# Patient Record
Sex: FEMALE | Race: WHITE | NOT HISPANIC OR LATINO | Employment: FULL TIME | URBAN - METROPOLITAN AREA
[De-identification: names, ages, dates, MRNs, and addresses within clinical notes are randomized per-mention and may not be internally consistent; named-entity substitution may affect disease eponyms.]

---

## 2020-08-11 ENCOUNTER — VIRTUAL VISIT (OUTPATIENT)
Dept: FAMILY MEDICINE | Facility: OTHER | Age: 25
End: 2020-08-11

## 2020-08-11 ENCOUNTER — AMBULATORY - HEALTHEAST (OUTPATIENT)
Dept: FAMILY MEDICINE | Facility: CLINIC | Age: 25
End: 2020-08-11

## 2020-08-11 DIAGNOSIS — Z20.822 SUSPECTED COVID-19 VIRUS INFECTION: ICD-10-CM

## 2020-08-12 NOTE — PROGRESS NOTES
"Date: 2020 10:38:53  Clinician: Marcos Miranda  Clinician NPI: [id number]  Patient: Latasha Hawley  Patient : 1995  Patient Address: [facility] [address], [place] [address]  Patient Phone: [phone]  Visit Protocol: URI  Patient Summary:  Latasha is a 25 year old ( : 1995 ) female who initiated a Visit for COVID-19 (Coronavirus) evaluation and screening. When asked the question \"Please sign me up to receive news, health information and promotions. \", Latasha responded \"No\".    When asked when her symptoms started, Latasha reported that she does not have any symptoms.   She denies taking antibiotic medication in the past month and having recent facial or sinus surgery in the past 60 days.    Pertinent COVID-19 (Coronavirus) information  In the past 14 days, Latasha has not worked in a congregate living setting.   She does not work or volunteer as healthcare worker or a  and does not work or volunteer in a healthcare facility.   Latasha also has not lived in a congregate living setting in the past 14 days. She does not live with a healthcare worker.   Latasha has had a close contact with a laboratory-confirmed COVID-19 patient in the last 14 days. She was exposed at her work. Additional information about contact with COVID-19 (Coronavirus) patient as reported by the patient (free text): exposed to a coworker, at work, on 6/3.    Patient reported they are not living in the same household with a COVID-19 positive patient.  Patient was in an enclosed space for greater than 15 minutes with a COVID-19 patient.  Since 2019, Latasha and has not had upper respiratory infection or influenza-like illness. Has not been diagnosed with lab-confirmed COVID-19 test   Pertinent medical history  Latasha does not get yeast infections when she takes antibiotics.   Latasha does not need a return to work/school note.   Weight: 160 lbs   Latasha does not smoke or use smokeless tobacco.   She denies pregnancy and denies " breastfeeding. She has menstruated in the past month.   Weight: 160 lbs    MEDICATIONS: No current medications, ALLERGIES: NKDA  Clinician Response:  Dear Latasha,   Based on your exposure to COVID-19 (coronavirus), we would like to test you for this virus.  1. Please call [phone] to schedule your visit. Explain that you were [facility] to have a COVID-19 test. Be ready to share your [facility] visit ID number.  The following will serve as your written order for this COVID Test, ordered by me, for the indication of suspected COVID [Z20.828]: The test will be ordered in [facility], our electronic health record, after you are scheduled. It will show as ordered and authorized by Mart Guzmán MD.  Order: COVID-19 (coronavirus) PCR for ASYMPTOMATIC EXPOSURE testing from [facility].  If you know you have had close contact with someone who tested positive, you should be quarantined for 14 days after this exposure. You should stay in quarantine for the14 days even if the covid test is negative, the optimal time to test after exposure is 5-7 days from the exposure  Quarantine means   What should I do?  For safety, it's very important to follow these rules. Do this for 14 days after the date you were last exposed to the virus..  Stay home and away from others. Don't go to school or anywhere else. Generally quarantine means staying home from work but there are some exceptions to this. Please contact your workplace.   No hugging, kissing or shaking hands.  Don't let anyone visit.  Cover your mouth and nose with a mask, tissue or washcloth to avoid spreading germs.  Wash your hands and face often. Use soap and water.  What are the symptoms of COVID-19?  The most common symptoms are cough, fever and trouble breathing. Less common symptoms include headache, body aches, fatigue (feeling very tired), chills, sore throat, stuffy or runny nose, diarrhea (loose poop), loss of taste or smell, belly pain, and nausea or vomiting (feeling sick  to your stomach or throwing up).  After 14 days, if you have still don't have symptoms, you likely don't have this virus.  If you develop symptoms, follow these guidelines.  If you're normally healthy: Please start another [facility] visit to report your symptoms. Go to [email or web address].  If you have a serious health problem (like cancer, heart failure, an organ transplant or kidney disease): Call your specialty clinic. Let them know that you might have COVID-19.  2. When it's time for your COVID test:  Stay at least 6 feet away from others. (If someone will drive you to your test, stay in the backseat, as far away from the  as you can.)  Cover your mouth and nose with a mask, tissue or washcloth.  Go straight to the testing site. Don't make any stops on the way there or back.  Please note  Caregivers in these groups are at risk for severe illness due to COVID-19:  o People 65 years and older  o People who live in a nursing home or long-term care facility  o People with chronic disease (lung, heart, cancer, diabetes, kidney, liver, immunologic)  o People who have a weakened immune system, including those who:  Are in cancer treatment  Take medicine that weakens the immune system, such as corticosteroids  Had a bone marrow or organ transplant  Have an immune deficiency  Have poorly controlled HIV or AIDS  Are obese (body mass index of 40 or higher)  Smoke regularly  Where can I get more information?  [facility] -- About COVID-19: [email or web address]  CDC -- What to Do If You're Sick: www.cdc.gov/coronavirus/2019-ncov/about/steps-when-sick.html  CDC -- Ending Home Isolation: www.cdc.gov/coronavirus/2019-ncov/hcp/disposition-in-home-patients.html  CDC -- Caring for Someone: www.cdc.gov/coronavirus/2019-ncov/if-you-are-sick/care-for-someone.html  [facility] -- Interim Guidance for Hospital Discharge to Home: www.health.[facility].[place]/diseases/coronavirus/hcp/hospdischarge.pdf  [facility]  trials (COVID-19 research studies): clinicalaffairs.[facility]/n-clinical-trials  Below are the COVID-19 hotlines at the [place] Department of Health ([facility]). Interpreters are available.  For health questions: Call 761-355-4400 or 1-194.623.1403 (7 a.m. to 7 p.m.)  For questions about schools and childcare: Call 444-730-3532 or 1-219.306.7591 (7 a.m. to 7 p.m.)    Diagnosis: Contact with and (suspected) exposure to other viral communicable diseases  Diagnosis ICD: Z20.828

## 2020-08-13 ENCOUNTER — COMMUNICATION - HEALTHEAST (OUTPATIENT)
Dept: SCHEDULING | Facility: CLINIC | Age: 25
End: 2020-08-13

## 2020-11-18 ENCOUNTER — VIRTUAL VISIT (OUTPATIENT)
Dept: FAMILY MEDICINE | Facility: OTHER | Age: 25
End: 2020-11-18

## 2020-11-18 NOTE — PROGRESS NOTES
"Date: 2020 14:11:45  Clinician: Tameka Gan  Clinician NPI: [id number]  Patient: Latasha Hawley  Patient : 1995  Patient Address: [facility] [address], [place] [address]  Patient Phone: [phone]  Visit Protocol: URI  Patient Summary:  Latasha is a 25 year old ( : 1995 ) female who initiated a [facility] Visit for COVID-19 (Coronavirus) evaluation and screening. When asked the question \"Please sign me up to receive news, health information and promotions. \", Latasha responded \"No\".    Latasha states her symptoms started today.   Her symptoms consist of a sore throat and nasal congestion. She is experiencing difficulty breathing due to nasal congestion but she is not short of breath.   Symptom details     Nasal secretions: The color of her mucus is clear.    Sore throat: Latasha reports having mild throat pain (1-3 on a 10 point pain scale), does not have exudate on her tonsils, and can swallow liquids. The lymph nodes in her neck are not enlarged. A rash has not appeared on the skin since the sore throat started.      Latasha denies having vomiting, rhinitis, facial pain or pressure, myalgias, chills, malaise, teeth pain, ageusia, diarrhea, ear pain, headache, wheezing, fever, enlarged lymph nodes, cough, nausea, and anosmia. She also denies taking antibiotic medication in the past month and having recent facial or sinus surgery in the past 60 days.   Precipitating events  Within the past week, Latasha has not been exposed to someone with strep throat.   Pertinent COVID-19 (Coronavirus) information  Latasha does not work or volunteer as healthcare worker or a . In the past 14 days, Latasha has not worked or volunteered at a healthcare facility or group living setting.   In the past 14 days, she also has not lived in a congregate living setting.   Latasha has had a close contact with a laboratory-confirmed COVID-19 patient within 14 days of symptom onset. She was exposed at her work. Date Latasha was exposed to the " laboratory-confirmed COVID-19 patient: 11/15/2020   Additional information about contact with COVID-19 (Coronavirus) patient as reported by the patient (free text): Potentially exposed to two people at work who we now know are positive or are showing symptoms. Boyfriend was exposed at work and is awaiting results from his second test.    Since December 2019, Latasha has been tested for COVID-19 and has not had upper respiratory infection or influenza-like illness.      Result of COVID-19 test: Negative     Date of her COVID-19 test: 08/11/2020      Pertinent medical history  Latasha does not get yeast infections when she takes antibiotics.   Latasha does not need a return to work/school note.   Weight: 160 lbs   Latasha does not smoke or use smokeless tobacco.   She denies pregnancy and denies breastfeeding. She has menstruated in the past month.   Weight: 160 lbs    MEDICATIONS: No current medications, ALLERGIES: NKDA  Clinician Response:  Dear Latasha,  Based on the information provided, you have a viral upper respiratory infection, otherwise known as a cold. Symptoms vary from person to person, but can include sneezing, coughing, a runny nose, sore throat, and headache and range from mild to severe.  Unfortunately, there are no medications that can cure a cold, so treatment is focused on controlling symptoms as much as possible. Most people gradually feel better until symptoms are gone in 1-2 weeks.  Medication information  Because you have a viral infection, antibiotics will not help you get better. Treating a viral infection with antibiotics could actually make you feel worse.  Self care  Steps you can take to be as comfortable as possible:     Rest.    Drink plenty of fluids.    Take a warm shower to loosen congestion    Use a cool-mist humidifier.    Use throat lozenges.    Suck on frozen items such as popsicles.    Drink hot tea with lemon and honey.    Gargle with warm salt water (1/4 teaspoon of salt per 8 ounce glass of  water).     When to seek care  Please be seen in a clinic or urgent care if any of the following occur:   New symptoms develop, or symptoms become worse   Call ahead before going to the clinic or urgent care.  Call 911 or go to the emergency room if you feel that your throat is closing off, you suddenly develop a rash, you are unable to swallow fluids, you are drooling, or you are having difficulty breathing.  Additional treatment plan   Your symptoms show that you may have coronavirus (COVID-19). This illness can cause fever, cough and trouble breathing. Many people get a mild case and get better on their own. Some people can get very sick.  Based on the symptoms you have shared, I would like you to be re-checked in 2 to 3 days. Please call your family clinic to set up a video or phone visit.  Will I be tested for COVID-19?  We would like to test you for this virus.   Please call [phone] to schedule your visit. Explain that you were referred by [facility] to have a COVID-19 test. Be ready to share your [facility] visit ID number.   * If you need to schedule in [place] or [email or web address] please call [phone] or for [employer] employees please call [phone].    The following will serve as your written order for this COVID Test, ordered by me, for the indication of suspected COVID [Z20.828]: The test will be ordered in [employer], our electronic health record, after you are scheduled. It will show as ordered and authorized by Mart Guzmán MD.  Order: COVID-19 (Coronavirus) PCR for SYMPTOMATIC testing from [facility].   1.When it's time for your COVID test:   Stay at least 6 feet away from others. (If someone will drive you to your test, stay in the backseat, as far away from the  as you can.)   Cover your mouth and nose with a mask, tissue or washcloth.  Go straight to the testing site. Don't make any stops on the way there or back.      2.Starting now: Stay home and away from others (self-isolate) until:   You've had  "no fever---and no medicine that reduces fever---for one full day (24 hours). And...   Your other symptoms have gotten better. For example, your cough or breathing has improved. And...   At least 10 days have passed since your symptoms started.       During this time, don't leave the house except for testing or medical care.   Stay in your own room, even for meals. Use your own bathroom if you can.   Stay away from others in your home. No hugging, kissing or shaking hands. No visitors.  Don't go to work, school or anywhere else.    Clean \"high touch\" surfaces often (doorknobs, counters, handles, etc.). Use a household cleaning spray or wipes. You'll find a full list of  on the EPA website: www.epa.gov/pesticide-registration/list-n-disinfectants-use-against-sars-cov-2.   Cover your mouth and nose with a mask, tissue or washcloth to avoid spreading germs.  Wash your hands and face often. Use soap and water.  Caregivers in these groups are at risk for severe illness due to COVID-19:  o People 65 years and older  o People who live in a nursing home or long-term care facility  o People with chronic disease (lung, heart, cancer, diabetes, kidney, liver, immunologic)   o People who have a weakened immune system, including those who:   Are in cancer treatment  Take medicine that weakens the immune system, such as corticosteroids  Had a bone marrow or organ transplant  Have an immune deficiency  Have poorly controlled HIV or AIDS  Are obese (body mass index of 40 or higher)  Smoke regularly   o Caregivers should wear gloves while washing dishes, handling laundry and cleaning bedrooms and bathrooms.  o Use caution when washing and drying laundry: Don't shake dirty laundry, and use the warmest water setting that you can.  o For more tips, go to www.cdc.gov/coronavirus/2019-ncov/downloads/10Things.pdf.      How can I take care of myself?   Get lots of rest. Drink extra fluids (unless a doctor has told you not to)   Take " Tylenol (acetaminophen) for fever or pain. If you have liver or kidney problems, ask your family doctor if it's okay to take Tylenol.   Adults can take either:    650 mg (two 325 mg pills) every 4 to 6 hours, or...   1,000 mg (two 500 mg pills) every 8 hours as needed.    Note: Don't take more than 3,000 mg in one day. Acetaminophen is found in many medicines (both prescribed and over-the-counter medicines). Read all labels to be sure you don't take too much.   For children, check the Tylenol bottle for the right dose. The dose is based on the child's age or weight.    If you have other health problems (like cancer, heart failure, an organ transplant or severe kidney disease): Call your specialty clinic if you don't feel better in the next 2 days.       Know when to call 911. Emergency warning signs include:    Trouble breathing or shortness of breath Pain or pressure in the chest that doesn't go away Feeling confused like you haven't felt before, or not being able to wake up Bluish-colored lips or face  Where can I get more information?   [facility] -- About COVID-19: [email or web address]   CDC -- What to Do If You're Sick: www.cdc.gov/coronavirus/2019-ncov/about/steps-when-sick.html   [facility] -- Ending Home Isolation: www.cdc.gov/coronavirus/2019-ncov/hcp/disposition-in-home-patients.html   CDC -- Caring for Someone: www.cdc.gov/coronavirus/2019-ncov/if-you-are-sick/care-for-someone.html   [facility] -- Interim Guidance for Hospital Discharge to Home: www.health.[facility].[place]/diseases/coronavirus/hcp/hospdischarge.pdf   [facility] clinical trials (COVID-19 research studies): clinicalaffairs.[employer].[facility]/umn-clinical-trials    Below are the COVID-19 hotlines at the [place] Department of Health ([facility]). Interpreters are available.    For health questions: Call 639-477-3519 or 1-710.756.6162 (7 a.m. to 7 p.m.) For questions about schools and childcare: Call 334-426-4571 or 1-934.912.2760 (7 a.m. to 7  p.m.)       Diagnosis: Contact with and (suspected) exposure to other viral communicable diseases  Diagnosis ICD: Z20.828

## 2020-11-19 ENCOUNTER — AMBULATORY - HEALTHEAST (OUTPATIENT)
Dept: FAMILY MEDICINE | Facility: CLINIC | Age: 25
End: 2020-11-19

## 2020-11-19 DIAGNOSIS — Z20.822 SUSPECTED COVID-19 VIRUS INFECTION: ICD-10-CM

## 2020-11-22 ENCOUNTER — AMBULATORY - HEALTHEAST (OUTPATIENT)
Dept: FAMILY MEDICINE | Facility: CLINIC | Age: 25
End: 2020-11-22

## 2020-11-22 DIAGNOSIS — Z20.822 SUSPECTED COVID-19 VIRUS INFECTION: ICD-10-CM

## 2020-11-24 ENCOUNTER — COMMUNICATION - HEALTHEAST (OUTPATIENT)
Dept: SCHEDULING | Facility: CLINIC | Age: 25
End: 2020-11-24

## 2021-08-15 ENCOUNTER — HEALTH MAINTENANCE LETTER (OUTPATIENT)
Age: 26
End: 2021-08-15

## 2021-10-11 ENCOUNTER — HEALTH MAINTENANCE LETTER (OUTPATIENT)
Age: 26
End: 2021-10-11

## 2022-02-10 ENCOUNTER — OFFICE VISIT (OUTPATIENT)
Dept: URGENT CARE | Facility: URGENT CARE | Age: 27
End: 2022-02-10
Payer: COMMERCIAL

## 2022-02-10 ENCOUNTER — ANCILLARY PROCEDURE (OUTPATIENT)
Dept: GENERAL RADIOLOGY | Facility: CLINIC | Age: 27
End: 2022-02-10
Attending: PHYSICIAN ASSISTANT
Payer: COMMERCIAL

## 2022-02-10 VITALS
SYSTOLIC BLOOD PRESSURE: 124 MMHG | TEMPERATURE: 98.5 F | WEIGHT: 170 LBS | DIASTOLIC BLOOD PRESSURE: 72 MMHG | HEART RATE: 84 BPM | OXYGEN SATURATION: 97 % | RESPIRATION RATE: 18 BRPM

## 2022-02-10 DIAGNOSIS — S92.355A CLOSED NONDISPLACED FRACTURE OF FIFTH METATARSAL BONE OF LEFT FOOT, INITIAL ENCOUNTER: Primary | ICD-10-CM

## 2022-02-10 DIAGNOSIS — S99.922A FOOT INJURY, LEFT, INITIAL ENCOUNTER: ICD-10-CM

## 2022-02-10 PROCEDURE — 99203 OFFICE O/P NEW LOW 30 MIN: CPT | Performed by: PHYSICIAN ASSISTANT

## 2022-02-10 PROCEDURE — 73630 X-RAY EXAM OF FOOT: CPT | Mod: LT | Performed by: RADIOLOGY

## 2022-02-10 NOTE — PATIENT INSTRUCTIONS
Patient was educated on the natural course of injury. X-ray shows:  Nondisplaced oblique fracture distal shaft left fifth  Metatarsal. Conservative measures discussed including rest, ice, compression, elevation, and over-the-counter analgesics (Tylenol or Ibuprofen) as needed. Placed in ortho boot. See ortho in 7 days for follow-up. Seek emergency care if you develop severe pain/swelling, inability to move extremity, skin paleness, or weakness.

## 2022-02-10 NOTE — PROGRESS NOTES
URGENT CARE VISIT:    SUBJECTIVE:   Chief Complaint   Patient presents with     Urgent Care     Lt foot swollen and bruising after patient had rolled her ankle yesterday evening.      Latasha Hawley is a 26 year old female who presents with a chief complaint of left foot swelling and bruising.  Symptoms began 1 day(s) ago, are moderate and sudden onset  She jumped and landed on side of foot.  Pain exacerbated by weight-bearing. Relieved by rest.  She treated it initially with no therapy. This is the first time this type of injury has occurred to this patient.     PMH: History reviewed. No pertinent past medical history.  Allergies: Patient has no known allergies.   Medications:   No current outpatient medications on file.     Social History:   Social History     Tobacco Use     Smoking status: Never Smoker     Smokeless tobacco: Never Used   Substance Use Topics     Alcohol use: Not Currently       ROS:  Review of systems negative except as stated above.    OBJECTIVE:  /72 (BP Location: Left arm, Patient Position: Sitting, Cuff Size: Adult Regular)   Pulse 84   Temp 98.5  F (36.9  C) (Tympanic)   Resp 18   Wt 77.1 kg (170 lb)   SpO2 97%   GENERAL APPEARANCE: healthy, alert and no distress  MUSCULOSKELETAL: moderate TTP over lateral dorsum of left foot. FROM ankle. Mild TTP over lateral malleolus.  EXTREMITIES: peripheral pulses normal  SKIN: Mild edema over lateral dorsum of left foot.  NEURO: sensation intact.    IMAGING:  Results for orders placed or performed in visit on 02/10/22   XR Foot Left G/E 3 Views     Status: None (Preliminary result)    Narrative    FOOT LEFT THREE OR MORE VIEWS    DATE/TIME: 2/10/2022 1:21 PM    INDICATION: Foot injury, left, initial encounter  COMPARISON: None available.      Impression    IMPRESSION: Nondisplaced oblique fracture distal shaft left fifth  metatarsal. No joint malalignment left foot. Bipartite hallux tibial  sesamoid. No significant joint space narrowing.  Mild soft tissue  swelling adjacent to the fifth metatarsal fracture.        ASSESSMENT:    ICD-10-CM    1. Closed nondisplaced fracture of fifth metatarsal bone of left foot, initial encounter  S92.355A XR Foot Left G/E 3 Views     Orthopedic  Referral     Ankle/Foot Bracing Supplies Order for DME - ONLY FOR DME       PLAN:  Patient Instructions   Patient was educated on the natural course of injury. X-ray shows:  Nondisplaced oblique fracture distal shaft left fifth  Metatarsal. Conservative measures discussed including rest, ice, compression, elevation, and over-the-counter analgesics (Tylenol or Ibuprofen) as needed. Placed in ortho boot. See ortho in 7 days for follow-up. Seek emergency care if you develop severe pain/swelling, inability to move extremity, skin paleness, or weakness.     Patient verbalized understanding and is agreeable to plan. The patient was discharged ambulatory and in stable condition.    Anabelle Dozier PA-C on 2/10/2022 at 1:52 PM

## 2022-02-15 ENCOUNTER — ANCILLARY PROCEDURE (OUTPATIENT)
Dept: GENERAL RADIOLOGY | Facility: CLINIC | Age: 27
End: 2022-02-15
Attending: FAMILY MEDICINE
Payer: COMMERCIAL

## 2022-02-15 ENCOUNTER — OFFICE VISIT (OUTPATIENT)
Dept: ORTHOPEDICS | Facility: CLINIC | Age: 27
End: 2022-02-15
Attending: PHYSICIAN ASSISTANT
Payer: COMMERCIAL

## 2022-02-15 VITALS — WEIGHT: 170 LBS

## 2022-02-15 DIAGNOSIS — S92.355A CLOSED NONDISPLACED FRACTURE OF FIFTH METATARSAL BONE OF LEFT FOOT, INITIAL ENCOUNTER: ICD-10-CM

## 2022-02-15 PROCEDURE — 73630 X-RAY EXAM OF FOOT: CPT | Mod: LT | Performed by: RADIOLOGY

## 2022-02-15 PROCEDURE — 99203 OFFICE O/P NEW LOW 30 MIN: CPT | Performed by: FAMILY MEDICINE

## 2022-02-15 NOTE — LETTER
2/15/2022      RE: Latasha Hawley  [address]  [place] [address]       CHIEF COMPLAINT:  Pain of the Left Foot     HISTORY OF PRESENT ILLNESS  Ms. Hawley is a pleasant 26 year old year old female who presents to clinic today with left fifth MT fx.  Latasha explains that she rolled her ankle while jumping and her a snap    Onset: sudden  Location: left foot  Quality:  sharp  Duration: 5 days   Severity: 4/10 at worst  Timing:constant  Modifying factors:  resting and non-use makes it better, movement and use makes it worse  Associated signs & symptoms: pain, tenderness and swelling  Previous similar pain: Yes, ankle sprains  Treatments to date: Boot, tylenol     Additional history: as documented    Review of Systems:    Have you recently had a a fever, chills, weight loss? No    Do you have any vision problems? No    Do you have any chest pain or edema? No    Do you have any shortness of breath or wheezing?  No    Do you have stomach problems? No    Do you have any numbness or focal weakness? No    Do you have diabetes? No    Do you have problems with bleeding or clotting? No    Do you have an rashes or other skin lesions? No    MEDICAL HISTORY  There is no problem list on file for this patient.      No current outpatient medications on file.       No Known Allergies    No family history on file.    Additional medical/Social/Surgical histories reviewed in EPIC and updated as appropriate.       PHYSICAL EXAM  Wt 77.1 kg (170 lb)     General  - normal appearance, in no obvious distress  CV- Lower leg warm and well perfused  Musculoskeletal - left foot  - stance: normal gait without limp  - inspection: Mild swelling of left foot, ecchymosis lateral foot.  - palpation: Tenderness of fifth metatarsal shaft  - ROM: normal active and passive ROM of great and lesser toes, no pain with MT translation  - strength: 5/5 in all planes  Neuro  - no sensory or motor deficit, grossly normal coordination, normal muscle  tone    IMAGING : XR foot left 3V. Final results and radiologist's interpretation, available in the [facility] health record. Images were reviewed with the patient/family members in the office today. My personal interpretation of the performed imaging is redemonstration of nondisplaced obliquely oriented fifth metatarsal fracture.    X-rays from 2/10/22 independently interpreted.  Urgent care visit from 2/10/22 reviewed.     ASSESSMENT & PLAN  Ms. Hawley is a 26 year old year old female who presents to clinic today for definitive management of fifth metatarsal fracture of left foot.      Surgical vs. nonsurgical means for fracture care reviewed.  Taking into account nondisplaced nature of her oblique this metatarsal shaft fracture, I do think she will do well with nonsurgical care as described below.     Diagnosis: Nondisplaced oblique fracture distal shaft left fifth  metatarsal    -Ensured fit of boot and continue in CAM walker  -May progressively increase WBAT, pain as guide reiterated  -Elevation, ice, OTC analgesics.  -Follow up 2 weeks with repeat films    It was a pleasure seeing Latasha today.    Trenton Hair DO, CAQSM  Primary Care Sports Medicine

## 2022-02-15 NOTE — PROGRESS NOTES
CHIEF COMPLAINT:  Pain of the Left Foot     HISTORY OF PRESENT ILLNESS  Ms. Hawley is a pleasant 26 year old year old female who presents to clinic today with left fifth MT fx.  Latasha explains that she rolled her ankle while jumping and her a snap    Onset: sudden  Location: left foot  Quality:  sharp  Duration: 5 days   Severity: 4/10 at worst  Timing:constant  Modifying factors:  resting and non-use makes it better, movement and use makes it worse  Associated signs & symptoms: pain, tenderness and swelling  Previous similar pain: Yes, ankle sprains  Treatments to date: Boot, tylenol     Additional history: as documented    Review of Systems:    Have you recently had a a fever, chills, weight loss? No    Do you have any vision problems? No    Do you have any chest pain or edema? No    Do you have any shortness of breath or wheezing?  No    Do you have stomach problems? No    Do you have any numbness or focal weakness? No    Do you have diabetes? No    Do you have problems with bleeding or clotting? No    Do you have an rashes or other skin lesions? No    MEDICAL HISTORY  There is no problem list on file for this patient.      No current outpatient medications on file.       No Known Allergies    No family history on file.    Additional medical/Social/Surgical histories reviewed in [facility] and updated as appropriate.       PHYSICAL EXAM  Wt 77.1 kg (170 lb)     General  - normal appearance, in no obvious distress  CV- Lower leg warm and well perfused  Musculoskeletal - left foot  - stance: normal gait without limp  - inspection: Mild swelling of left foot, ecchymosis lateral foot.  - palpation: Tenderness of fifth metatarsal shaft  - ROM: normal active and passive ROM of great and lesser toes, no pain with MT translation  - strength: 5/5 in all planes  Neuro  - no sensory or motor deficit, grossly normal coordination, normal muscle tone    IMAGING : XR foot left 3V. Final results and radiologist's interpretation,  available in the [facility] health record. Images were reviewed with the patient/family members in the office today. My personal interpretation of the performed imaging is redemonstration of nondisplaced obliquely oriented fifth metatarsal fracture.    X-rays from 2/10/22 independently interpreted.  Urgent care visit from 2/10/22 reviewed.     ASSESSMENT & PLAN  Ms. Hawley is a 26 year old year old female who presents to clinic today for definitive management of fifth metatarsal fracture of left foot.      Surgical vs. nonsurgical means for fracture care reviewed.  Taking into account nondisplaced nature of her oblique this metatarsal shaft fracture, I do think she will do well with nonsurgical care as described below.     Diagnosis: Nondisplaced oblique fracture distal shaft left fifth  metatarsal    -Ensured fit of boot and continue in CAM walker  -May progressively increase WBAT, pain as guide reiterated  -Elevation, ice, OTC analgesics.  -Follow up 2 weeks with repeat films    It was a pleasure seeing Latasha today.    Trenton Hair DO, CAQSM  Primary Care Sports Medicine

## 2022-02-28 DIAGNOSIS — S92.355A CLOSED NONDISPLACED FRACTURE OF FIFTH METATARSAL BONE OF LEFT FOOT, INITIAL ENCOUNTER: Primary | ICD-10-CM

## 2022-03-01 ENCOUNTER — OFFICE VISIT (OUTPATIENT)
Dept: ORTHOPEDICS | Facility: CLINIC | Age: 27
End: 2022-03-01
Payer: COMMERCIAL

## 2022-03-01 ENCOUNTER — ANCILLARY PROCEDURE (OUTPATIENT)
Dept: GENERAL RADIOLOGY | Facility: CLINIC | Age: 27
End: 2022-03-01
Attending: FAMILY MEDICINE
Payer: COMMERCIAL

## 2022-03-01 VITALS — WEIGHT: 170 LBS

## 2022-03-01 DIAGNOSIS — S92.355A CLOSED NONDISPLACED FRACTURE OF FIFTH METATARSAL BONE OF LEFT FOOT, INITIAL ENCOUNTER: ICD-10-CM

## 2022-03-01 DIAGNOSIS — S92.355D CLOSED NONDISPLACED FRACTURE OF FIFTH METATARSAL BONE OF LEFT FOOT WITH ROUTINE HEALING, SUBSEQUENT ENCOUNTER: Primary | ICD-10-CM

## 2022-03-01 PROCEDURE — 73630 X-RAY EXAM OF FOOT: CPT | Mod: LT | Performed by: RADIOLOGY

## 2022-03-01 PROCEDURE — 99213 OFFICE O/P EST LOW 20 MIN: CPT | Performed by: FAMILY MEDICINE

## 2022-03-01 NOTE — PROGRESS NOTES
ESTABLISHED PATIENT FOLLOW-UP:  RECHECK (Left foot)       HISTORY OF PRESENT ILLNESS  Ms. Hawley is a pleasant 26 year old year old female who presents to clinic today for follow-up of left foot fracture.     Date of injury/onset: 2/10/2022  Date last seen: 2/15/2022  Following Therapeutic Plan: Yes  Pain: Improving  Function: Improving  Interval History: Overall doing well in the boot, she has walked short distances at home which has felt okay. She does have an increase in pain after being on her feet for long period. She is now having pain in lateral ankle.     Review of Systems:    Do you have fever, chills, weight loss? No    Do you have any vision problems? No    Do you have any chest pain or edema? No    Do you have any shortness of breath or wheezing?  No    Do you have stomach problems? No    Do you have any numbness or focal weakness? No    Do you have diabetes? No    Do you have problems with bleeding or clotting? No    Do you have an rashes or other skin lesions? No    Additional medical/Social/Surgical histories reviewed in [facility] and updated as appropriate.       PHYSICAL EXAM  Wt 77.1 kg (170 lb)     General  - normal appearance, in no obvious distress  CV- Lower leg warm and well perfused  Musculoskeletal - left foot  - stance: normal gait without limp in boot  - inspection: Mild swelling of left foot, ecchymosis lateral foot.  - palpation: Tenderness of fifth metatarsal shaft  - ROM: normal active and passive ROM of great and lesser toes, no pain with ankle motion  - strength: 5/5 in all planes  Neuro  - no sensory or motor deficit, grossly normal coordination, normal muscle tone    IMAGING : XR foot left 3V. Final results and radiologist's interpretation, available in the [facility] health record. Images were reviewed with the patient/family members in the office today. My personal interpretation of the performed imaging is .stable appearing fifth metatarsal shaft fracture best appreciated on oblique  view.     ASSESSMENT & PLAN  Ms. Hawley is a 26 year old year old female who presents to clinic today with RECHECK (Left foot)    (P51.457K) Closed nondisplaced fracture of fifth metatarsal bone of left foot with routine healing, subsequent encounter    -Continue boot out of home and may start using postop shoe  -Weightbearing as tolerated, pain as guide  -XR reassuring and reviewed with patient, stable   -Follow up in 3 1/2 weeks; scheduled today and repeat xrays    It was a pleasure seeing Latasha.    Trenton Hair DO, CAQSM  Primary Care Sports Medicine

## 2022-03-23 DIAGNOSIS — S92.355D CLOSED NONDISPLACED FRACTURE OF FIFTH METATARSAL BONE OF LEFT FOOT WITH ROUTINE HEALING, SUBSEQUENT ENCOUNTER: Primary | ICD-10-CM

## 2022-03-29 ENCOUNTER — OFFICE VISIT (OUTPATIENT)
Dept: ORTHOPEDICS | Facility: CLINIC | Age: 27
End: 2022-03-29
Payer: COMMERCIAL

## 2022-03-29 ENCOUNTER — ANCILLARY PROCEDURE (OUTPATIENT)
Dept: GENERAL RADIOLOGY | Facility: CLINIC | Age: 27
End: 2022-03-29
Attending: FAMILY MEDICINE
Payer: COMMERCIAL

## 2022-03-29 VITALS — WEIGHT: 170 LBS

## 2022-03-29 DIAGNOSIS — S92.355D CLOSED NONDISPLACED FRACTURE OF FIFTH METATARSAL BONE OF LEFT FOOT WITH ROUTINE HEALING, SUBSEQUENT ENCOUNTER: Primary | ICD-10-CM

## 2022-03-29 DIAGNOSIS — S92.355D CLOSED NONDISPLACED FRACTURE OF FIFTH METATARSAL BONE OF LEFT FOOT WITH ROUTINE HEALING, SUBSEQUENT ENCOUNTER: ICD-10-CM

## 2022-03-29 PROCEDURE — 99213 OFFICE O/P EST LOW 20 MIN: CPT | Performed by: FAMILY MEDICINE

## 2022-03-29 PROCEDURE — 73630 X-RAY EXAM OF FOOT: CPT | Mod: LT | Performed by: RADIOLOGY

## 2022-03-29 NOTE — PROGRESS NOTES
ESTABLISHED PATIENT FOLLOW-UP:  RECHECK (left foot)       HISTORY OF PRESENT ILLNESS  Ms. Hawley is a pleasant 26 year old year old female who presents to clinic today for follow-up of left nondisplaced 5th metatarsal fracture.     Date of injury/onset: 2/10/2022  Date last seen: 3/1/2022  Following Therapeutic Plan: Yes  Pain: improved but will still have pain when she is on her feet for an extended period of time. She has not required OTC Tylenol or ibuprofen.   Function: improved  Interval History: she did wear a regular boot out to dinner on Saturday.    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       MEDICAL HISTORY  There is no problem list on file for this patient.      No current outpatient medications on file.       No Known Allergies    No family history on file.    Additional medical/Social/Surgical histories reviewed in [facility] and updated as appropriate.       PHYSICAL EXAM  Wt 77.1 kg (170 lb)     General  - normal appearance, in no obvious distress  Musculoskeletal - left foot  - stance: normal gait without limp  - inspection: no swelling or effusion,  normal bone and joint alignment, no obvious deformity  - palpation: no bony or soft tissue tenderness  - ROM: normal active and passive ROM of great and lesser toes, no pain with MT translation  - strength: 5/5 in all planes  Neuro  - no sensory or motor deficit, grossly normal coordination, normal muscle tone    IMAGING : XR left foot 3V. Final results and radiologist's interpretation, available in the [facility] health record. Images were reviewed with the patient/family members in the office today. My personal interpretation of the performed imaging is no acute osseous abnormality or significant degenerative changes of joint.    ASSESSMENT & PLAN  Ms. Hawley is a 26 year old year old female who presents to clinic today with RECHECK (left foot)    Diagnosis:   (S92.355D) Closed nondisplaced fracture of  fifth metatarsal bone of left foot with routine healing, subsequent encounter  (primary encounter diagnosis)    X-rays updated today reveal ongoing healing of fifth metatarsal shaft fracture.  This time she can start to wean out of her boot.  I have encouraged her to start by walking around the house only without her boot or postop shoe, and training for a firm soled athletic shoe or boot.  If this goes well she can progress to outside her house and at work.  Discussed this weaning process to take about 1 to 2 weeks.  After that time she continues pain as her guide and increase her activity as tolerated.    We discussed returning in 4 weeks if pain persists or difficulty weaning.      It was a pleasure seeing Latasha.    Trenton Hair DO, CAQSM  Primary Care Sports Medicine

## 2022-03-29 NOTE — LETTER
Date:March 30, 2022      Patient was self referred, no letter generated. Do not send.        [facility] Health Information

## 2022-03-29 NOTE — LETTER
3/29/2022      RE: Latasha Hawley  [address]  [place] [address]       ESTABLISHED PATIENT FOLLOW-UP:  RECHECK (left foot)       HISTORY OF PRESENT ILLNESS  Ms. Hawley is a pleasant 26 year old year old female who presents to clinic today for follow-up of left nondisplaced 5th metatarsal fracture.     Date of injury/onset: 2/10/2022  Date last seen: 3/1/2022  Following Therapeutic Plan: Yes  Pain: improved but will still have pain when she is on her feet for an extended period of time. She has not required OTC Tylenol or ibuprofen.   Function: improved  Interval History: she did wear a regular boot out to dinner on Saturday.    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       MEDICAL HISTORY  There is no problem list on file for this patient.      No current outpatient medications on file.       No Known Allergies    No family history on file.    Additional medical/Social/Surgical histories reviewed in [facility] and updated as appropriate.       PHYSICAL EXAM  Wt 77.1 kg (170 lb)     General  - normal appearance, in no obvious distress  Musculoskeletal - left foot  - stance: normal gait without limp  - inspection: no swelling or effusion,  normal bone and joint alignment, no obvious deformity  - palpation: no bony or soft tissue tenderness  - ROM: normal active and passive ROM of great and lesser toes, no pain with MT translation  - strength: 5/5 in all planes  Neuro  - no sensory or motor deficit, grossly normal coordination, normal muscle tone    IMAGING : XR left foot 3V. Final results and radiologist's interpretation, available in the [facility] health record. Images were reviewed with the patient/family members in the office today. My personal interpretation of the performed imaging is no acute osseous abnormality or significant degenerative changes of joint.    ASSESSMENT & PLAN  Ms. Hawley is a 26 year old year old female who presents to clinic today  with RECHECK (left foot)    Diagnosis:   (F30.710D) Closed nondisplaced fracture of fifth metatarsal bone of left foot with routine healing, subsequent encounter  (primary encounter diagnosis)    X-rays updated today reveal ongoing healing of fifth metatarsal shaft fracture.  This time she can start to wean out of her boot.  I have encouraged her to start by walking around the house only without her boot or postop shoe, and training for a firm soled athletic shoe or boot.  If this goes well she can progress to outside her house and at work.  Discussed this weaning process to take about 1 to 2 weeks.  After that time she continues pain as her guide and increase her activity as tolerated.    We discussed returning in 4 weeks if pain persists or difficulty weaning.      It was a pleasure seeing Latasha.    Trenton Hair DO, [facility]  Primary Care Sports Medicine        Trenton Hair DO

## 2022-09-24 ENCOUNTER — HEALTH MAINTENANCE LETTER (OUTPATIENT)
Age: 27
End: 2022-09-24

## 2023-01-23 ENCOUNTER — LAB REQUISITION (OUTPATIENT)
Dept: LAB | Facility: CLINIC | Age: 28
End: 2023-01-23

## 2023-01-23 DIAGNOSIS — Z34.01 ENCOUNTER FOR SUPERVISION OF NORMAL FIRST PREGNANCY, FIRST TRIMESTER: ICD-10-CM

## 2023-01-23 LAB
ABO/RH(D): NORMAL
ANTIBODY SCREEN: NEGATIVE
BASOPHILS # BLD AUTO: 0 10E3/UL (ref 0–0.2)
BASOPHILS NFR BLD AUTO: 0 %
EOSINOPHIL # BLD AUTO: 0.1 10E3/UL (ref 0–0.7)
EOSINOPHIL NFR BLD AUTO: 1 %
ERYTHROCYTE [DISTWIDTH] IN BLOOD BY AUTOMATED COUNT: 12.5 % (ref 10–15)
HBV SURFACE AG SERPL QL IA: NONREACTIVE
HCT VFR BLD AUTO: 42.5 % (ref 35–47)
HCV AB SERPL QL IA: NONREACTIVE
HGB BLD-MCNC: 13.9 G/DL (ref 11.7–15.7)
HIV 1+2 AB+HIV1 P24 AG SERPL QL IA: NONREACTIVE
HOLD SPECIMEN: NORMAL
IMM GRANULOCYTES # BLD: 0 10E3/UL
IMM GRANULOCYTES NFR BLD: 0 %
LYMPHOCYTES # BLD AUTO: 3 10E3/UL (ref 0.8–5.3)
LYMPHOCYTES NFR BLD AUTO: 29 %
MCH RBC QN AUTO: 29 PG (ref 26.5–33)
MCHC RBC AUTO-ENTMCNC: 32.7 G/DL (ref 31.5–36.5)
MCV RBC AUTO: 89 FL (ref 78–100)
MONOCYTES # BLD AUTO: 0.8 10E3/UL (ref 0–1.3)
MONOCYTES NFR BLD AUTO: 7 %
NEUTROPHILS # BLD AUTO: 6.5 10E3/UL (ref 1.6–8.3)
NEUTROPHILS NFR BLD AUTO: 63 %
NRBC # BLD AUTO: 0 10E3/UL
NRBC BLD AUTO-RTO: 0 /100
PLATELET # BLD AUTO: 281 10E3/UL (ref 150–450)
RBC # BLD AUTO: 4.79 10E6/UL (ref 3.8–5.2)
SPECIMEN EXPIRATION DATE: NORMAL
WBC # BLD AUTO: 10.5 10E3/UL (ref 4–11)

## 2023-01-23 PROCEDURE — 86901 BLOOD TYPING SEROLOGIC RH(D): CPT

## 2023-01-23 PROCEDURE — 87340 HEPATITIS B SURFACE AG IA: CPT

## 2023-01-23 PROCEDURE — 87491 CHLMYD TRACH DNA AMP PROBE: CPT

## 2023-01-23 PROCEDURE — 80081 OBSTETRIC PANEL INC HIV TSTG: CPT

## 2023-01-23 PROCEDURE — 87086 URINE CULTURE/COLONY COUNT: CPT

## 2023-01-23 PROCEDURE — 87389 HIV-1 AG W/HIV-1&-2 AB AG IA: CPT

## 2023-01-23 PROCEDURE — 86762 RUBELLA ANTIBODY: CPT

## 2023-01-23 PROCEDURE — 86803 HEPATITIS C AB TEST: CPT

## 2023-01-24 LAB
C TRACH DNA SPEC QL PROBE+SIG AMP: NEGATIVE
N GONORRHOEA DNA SPEC QL NAA+PROBE: NEGATIVE
RPR SER QL: NONREACTIVE
RUBV IGG SERPL QL IA: 2.33 INDEX
RUBV IGG SERPL QL IA: POSITIVE

## 2023-01-25 LAB
BACTERIA UR CULT: ABNORMAL
BACTERIA UR CULT: ABNORMAL

## 2023-03-27 ENCOUNTER — LAB REQUISITION (OUTPATIENT)
Dept: LAB | Facility: CLINIC | Age: 28
End: 2023-03-27
Payer: COMMERCIAL

## 2023-03-27 DIAGNOSIS — O99.211 OBESITY COMPLICATING PREGNANCY, FIRST TRIMESTER: ICD-10-CM

## 2023-03-27 LAB — GLUCOSE 1H P 50 G GLC PO SERPL-MCNC: 161 MG/DL (ref 70–129)

## 2023-03-27 PROCEDURE — 82950 GLUCOSE TEST: CPT | Mod: ORL | Performed by: NURSE PRACTITIONER

## 2023-04-04 ENCOUNTER — LAB REQUISITION (OUTPATIENT)
Dept: LAB | Facility: CLINIC | Age: 28
End: 2023-04-04
Payer: COMMERCIAL

## 2023-04-04 DIAGNOSIS — O99.810 ABNORMAL GLUCOSE COMPLICATING PREGNANCY: ICD-10-CM

## 2023-04-04 LAB
GESTATIONAL GTT 1 HR POST DOSE: 149 MG/DL (ref 60–179)
GESTATIONAL GTT 2 HR POST DOSE: 136 MG/DL (ref 60–154)
GESTATIONAL GTT 3 HR POST DOSE: 123 MG/DL (ref 60–139)
GLUCOSE P FAST SERPL-MCNC: 73 MG/DL (ref 60–94)

## 2023-04-04 PROCEDURE — 82952 GTT-ADDED SAMPLES: CPT | Mod: ORL | Performed by: NURSE PRACTITIONER

## 2023-04-04 PROCEDURE — 82951 GLUCOSE TOLERANCE TEST (GTT): CPT | Mod: ORL | Performed by: NURSE PRACTITIONER

## 2023-05-23 NOTE — LETTER
3/1/2022      RE: Latasha Hawley  [address]  [facility] [address]       ESTABLISHED PATIENT FOLLOW-UP:  RECHECK (Left foot)       HISTORY OF PRESENT ILLNESS  Ms. Hawley is a pleasant 26 year old year old female who presents to clinic today for follow-up of left foot fracture.     Date of injury/onset: 2/10/2022  Date last seen: 2/15/2022  Following Therapeutic Plan: Yes  Pain: Improving  Function: Improving  Interval History: Overall doing well in the boot, she has walked short distances at home which has felt okay. She does have an increase in pain after being on her feet for long period. She is now having pain in lateral ankle.     Review of Systems:    Do you have fever, chills, weight loss? No    Do you have any vision problems? No    Do you have any chest pain or edema? No    Do you have any shortness of breath or wheezing?  No    Do you have stomach problems? No    Do you have any numbness or focal weakness? No    Do you have diabetes? No    Do you have problems with bleeding or clotting? No    Do you have an rashes or other skin lesions? No    Additional medical/Social/Surgical histories reviewed in [facility] and updated as appropriate.       PHYSICAL EXAM  Wt 77.1 kg (170 lb)     General  - normal appearance, in no obvious distress  CV- Lower leg warm and well perfused  Musculoskeletal - left foot  - stance: normal gait without limp in boot  - inspection: Mild swelling of left foot, ecchymosis lateral foot.  - palpation: Tenderness of fifth metatarsal shaft  - ROM: normal active and passive ROM of great and lesser toes, no pain with ankle motion  - strength: 5/5 in all planes  Neuro  - no sensory or motor deficit, grossly normal coordination, normal muscle tone    IMAGING : XR foot left 3V. Final results and radiologist's interpretation, available in the [facility] health record. Images were reviewed with the patient/family members in the office today. My personal interpretation of the performed imaging is  Detail Level: Simple .stable appearing fifth metatarsal shaft fracture best appreciated on oblique view.     ASSESSMENT & PLAN  Ms. Hawley is a 26 year old year old female who presents to clinic today with RECHECK (Left foot)    (S93.695V) Closed nondisplaced fracture of fifth metatarsal bone of left foot with routine healing, subsequent encounter    -Continue boot out of home and may start using postop shoe  -Weightbearing as tolerated, pain as guide  -XR reassuring and reviewed with patient, stable   -Follow up in 3 1/2 weeks; scheduled today and repeat xrays    It was a pleasure seeing Latasha.    Trenton Hair DO, CAM  Primary Care Sports Medicine          Trenton Hair DO     Additional Notes: Patient consent was obtained to proceed with the visit and recommended plan of care after discussion of all risks and benefits, including the risks of COVID-19 exposure.

## 2023-06-12 ENCOUNTER — LAB REQUISITION (OUTPATIENT)
Dept: LAB | Facility: CLINIC | Age: 28
End: 2023-06-12
Payer: COMMERCIAL

## 2023-06-12 DIAGNOSIS — Z36.89 ENCOUNTER FOR OTHER SPECIFIED ANTENATAL SCREENING: ICD-10-CM

## 2023-06-12 LAB — HGB BLD-MCNC: 13.4 G/DL (ref 11.7–15.7)

## 2023-06-12 PROCEDURE — 86592 SYPHILIS TEST NON-TREP QUAL: CPT | Mod: ORL

## 2023-06-12 PROCEDURE — 85018 HEMOGLOBIN: CPT | Mod: ORL

## 2023-06-13 LAB — RPR SER QL: NONREACTIVE

## 2023-09-04 ENCOUNTER — HOSPITAL ENCOUNTER (OUTPATIENT)
Facility: CLINIC | Age: 28
Discharge: HOME | End: 2023-09-04
Attending: ADVANCED PRACTICE MIDWIFE | Admitting: ADVANCED PRACTICE MIDWIFE
Payer: COMMERCIAL

## 2023-09-04 VITALS — RESPIRATION RATE: 18 BRPM | SYSTOLIC BLOOD PRESSURE: 124 MMHG | DIASTOLIC BLOOD PRESSURE: 81 MMHG | TEMPERATURE: 98.2 F

## 2023-09-04 PROBLEM — Z36.89 ENCOUNTER FOR TRIAGE IN PREGNANT PATIENT: Status: ACTIVE | Noted: 2023-09-04

## 2023-09-04 PROCEDURE — G0463 HOSPITAL OUTPT CLINIC VISIT: HCPCS

## 2023-09-04 RX ORDER — METOCLOPRAMIDE 10 MG/1
10 TABLET ORAL EVERY 6 HOURS PRN
Status: DISCONTINUED | OUTPATIENT
Start: 2023-09-04 | End: 2023-09-04 | Stop reason: HOSPADM

## 2023-09-04 RX ORDER — ONDANSETRON 2 MG/ML
4 INJECTION INTRAMUSCULAR; INTRAVENOUS EVERY 6 HOURS PRN
Status: DISCONTINUED | OUTPATIENT
Start: 2023-09-04 | End: 2023-09-04 | Stop reason: HOSPADM

## 2023-09-04 RX ORDER — ONDANSETRON 4 MG/1
4 TABLET, ORALLY DISINTEGRATING ORAL EVERY 6 HOURS PRN
Status: DISCONTINUED | OUTPATIENT
Start: 2023-09-04 | End: 2023-09-04 | Stop reason: HOSPADM

## 2023-09-04 RX ORDER — METOCLOPRAMIDE HYDROCHLORIDE 5 MG/ML
10 INJECTION INTRAMUSCULAR; INTRAVENOUS EVERY 6 HOURS PRN
Status: DISCONTINUED | OUTPATIENT
Start: 2023-09-04 | End: 2023-09-04 | Stop reason: HOSPADM

## 2023-09-04 RX ORDER — ASPIRIN 81 MG/1
81 TABLET, CHEWABLE ORAL DAILY
Status: ON HOLD | COMMUNITY
End: 2023-09-07

## 2023-09-04 RX ORDER — PROCHLORPERAZINE 25 MG
25 SUPPOSITORY, RECTAL RECTAL EVERY 12 HOURS PRN
Status: DISCONTINUED | OUTPATIENT
Start: 2023-09-04 | End: 2023-09-04 | Stop reason: HOSPADM

## 2023-09-04 RX ORDER — VITAMIN A ACETATE, .BETA.-CAROTENE, ASCORBIC ACID, CHOLECALCIFEROL, .ALPHA.-TOCOPHEROL ACETATE, DL-, THIAMINE MONONITRATE, RIBOFLAVIN, NIACINAMIDE, PYRIDOXINE HYDROCHLORIDE, FOLIC ACID, CYANOCOBALAMIN, CALCIUM CARBONATE, FERROUS FUMARATE, ZINC OXIDE, AND CUPRIC OXIDE 2000; 2000; 120; 400; 22; 1.84; 3; 20; 10; 1; 12; 200; 27; 25; 2 [IU]/1; [IU]/1; MG/1; [IU]/1; MG/1; MG/1; MG/1; MG/1; MG/1; MG/1; UG/1; MG/1; MG/1; MG/1; MG/1
1 TABLET ORAL DAILY
COMMUNITY

## 2023-09-04 RX ORDER — PROCHLORPERAZINE MALEATE 5 MG
10 TABLET ORAL EVERY 6 HOURS PRN
Status: DISCONTINUED | OUTPATIENT
Start: 2023-09-04 | End: 2023-09-04 | Stop reason: HOSPADM

## 2023-09-05 ENCOUNTER — ANESTHESIA (OUTPATIENT)
Dept: OBGYN | Facility: CLINIC | Age: 28
End: 2023-09-05
Payer: COMMERCIAL

## 2023-09-05 ENCOUNTER — ANESTHESIA EVENT (OUTPATIENT)
Dept: OBGYN | Facility: CLINIC | Age: 28
End: 2023-09-05
Payer: COMMERCIAL

## 2023-09-05 ENCOUNTER — HOSPITAL ENCOUNTER (INPATIENT)
Facility: CLINIC | Age: 28
LOS: 2 days | Discharge: HOME HEALTH | End: 2023-09-07
Attending: MIDWIFE | Admitting: MIDWIFE
Payer: COMMERCIAL

## 2023-09-05 LAB
ABO/RH(D): NORMAL
ANTIBODY SCREEN: NEGATIVE
ERYTHROCYTE [DISTWIDTH] IN BLOOD BY AUTOMATED COUNT: 13.1 % (ref 10–15)
HCT VFR BLD AUTO: 37.3 % (ref 35–47)
HGB BLD-MCNC: 12.8 G/DL (ref 11.7–15.7)
HOLD SPECIMEN: NORMAL
MCH RBC QN AUTO: 30.8 PG (ref 26.5–33)
MCHC RBC AUTO-ENTMCNC: 34.3 G/DL (ref 31.5–36.5)
MCV RBC AUTO: 90 FL (ref 78–100)
PLATELET # BLD AUTO: 175 10E3/UL (ref 150–450)
RBC # BLD AUTO: 4.16 10E6/UL (ref 3.8–5.2)
SPECIMEN EXPIRATION DATE: NORMAL
T PALLIDUM AB SER QL: NONREACTIVE
WBC # BLD AUTO: 14.6 10E3/UL (ref 4–11)

## 2023-09-05 PROCEDURE — 86850 RBC ANTIBODY SCREEN: CPT | Performed by: MIDWIFE

## 2023-09-05 PROCEDURE — 86780 TREPONEMA PALLIDUM: CPT | Performed by: MIDWIFE

## 2023-09-05 PROCEDURE — 258N000003 HC RX IP 258 OP 636: Performed by: MIDWIFE

## 2023-09-05 PROCEDURE — 250N000011 HC RX IP 250 OP 636: Mod: JZ | Performed by: ANESTHESIOLOGY

## 2023-09-05 PROCEDURE — 250N000011 HC RX IP 250 OP 636: Performed by: MIDWIFE

## 2023-09-05 PROCEDURE — 370N000003 HC ANESTHESIA WARD SERVICE: Performed by: ANESTHESIOLOGY

## 2023-09-05 PROCEDURE — 250N000009 HC RX 250: Performed by: MIDWIFE

## 2023-09-05 PROCEDURE — 85027 COMPLETE CBC AUTOMATED: CPT | Performed by: MIDWIFE

## 2023-09-05 PROCEDURE — 86901 BLOOD TYPING SEROLOGIC RH(D): CPT | Performed by: MIDWIFE

## 2023-09-05 PROCEDURE — 120N000001 HC R&B MED SURG/OB

## 2023-09-05 PROCEDURE — 00HU33Z INSERTION OF INFUSION DEVICE INTO SPINAL CANAL, PERCUTANEOUS APPROACH: ICD-10-PCS | Performed by: SURGERY

## 2023-09-05 PROCEDURE — 250N000011 HC RX IP 250 OP 636: Mod: JZ | Performed by: SURGERY

## 2023-09-05 PROCEDURE — 250N000011 HC RX IP 250 OP 636: Performed by: SURGERY

## 2023-09-05 PROCEDURE — 3E0R3BZ INTRODUCTION OF ANESTHETIC AGENT INTO SPINAL CANAL, PERCUTANEOUS APPROACH: ICD-10-PCS | Performed by: SURGERY

## 2023-09-05 PROCEDURE — 36415 COLL VENOUS BLD VENIPUNCTURE: CPT | Performed by: MIDWIFE

## 2023-09-05 PROCEDURE — 250N000013 HC RX MED GY IP 250 OP 250 PS 637: Performed by: MIDWIFE

## 2023-09-05 RX ORDER — PROCHLORPERAZINE 25 MG
25 SUPPOSITORY, RECTAL RECTAL EVERY 12 HOURS PRN
Status: DISCONTINUED | OUTPATIENT
Start: 2023-09-05 | End: 2023-09-06 | Stop reason: HOSPADM

## 2023-09-05 RX ORDER — ONDANSETRON 2 MG/ML
4 INJECTION INTRAMUSCULAR; INTRAVENOUS EVERY 6 HOURS PRN
Status: DISCONTINUED | OUTPATIENT
Start: 2023-09-05 | End: 2023-09-06 | Stop reason: HOSPADM

## 2023-09-05 RX ORDER — NALOXONE HYDROCHLORIDE 0.4 MG/ML
0.2 INJECTION, SOLUTION INTRAMUSCULAR; INTRAVENOUS; SUBCUTANEOUS
Status: DISCONTINUED | OUTPATIENT
Start: 2023-09-05 | End: 2023-09-06 | Stop reason: HOSPADM

## 2023-09-05 RX ORDER — NALOXONE HYDROCHLORIDE 0.4 MG/ML
0.4 INJECTION, SOLUTION INTRAMUSCULAR; INTRAVENOUS; SUBCUTANEOUS
Status: DISCONTINUED | OUTPATIENT
Start: 2023-09-05 | End: 2023-09-06 | Stop reason: HOSPADM

## 2023-09-05 RX ORDER — LIDOCAINE 40 MG/G
CREAM TOPICAL
Status: DISCONTINUED | OUTPATIENT
Start: 2023-09-05 | End: 2023-09-06 | Stop reason: HOSPADM

## 2023-09-05 RX ORDER — ROPIVACAINE HYDROCHLORIDE 2 MG/ML
INJECTION, SOLUTION EPIDURAL; INFILTRATION; PERINEURAL
Status: COMPLETED | OUTPATIENT
Start: 2023-09-05 | End: 2023-09-05

## 2023-09-05 RX ORDER — KETOROLAC TROMETHAMINE 30 MG/ML
30 INJECTION, SOLUTION INTRAMUSCULAR; INTRAVENOUS
Status: DISCONTINUED | OUTPATIENT
Start: 2023-09-05 | End: 2023-09-07

## 2023-09-05 RX ORDER — IBUPROFEN 400 MG/1
800 TABLET, FILM COATED ORAL
Status: DISCONTINUED | OUTPATIENT
Start: 2023-09-05 | End: 2023-09-07

## 2023-09-05 RX ORDER — ONDANSETRON 4 MG/1
4 TABLET, ORALLY DISINTEGRATING ORAL EVERY 6 HOURS PRN
Status: DISCONTINUED | OUTPATIENT
Start: 2023-09-05 | End: 2023-09-06 | Stop reason: HOSPADM

## 2023-09-05 RX ORDER — OXYTOCIN/0.9 % SODIUM CHLORIDE 30/500 ML
100-340 PLASTIC BAG, INJECTION (ML) INTRAVENOUS CONTINUOUS PRN
Status: DISCONTINUED | OUTPATIENT
Start: 2023-09-05 | End: 2023-09-07

## 2023-09-05 RX ORDER — DEXTROSE, SODIUM CHLORIDE, SODIUM LACTATE, POTASSIUM CHLORIDE, AND CALCIUM CHLORIDE 5; .6; .31; .03; .02 G/100ML; G/100ML; G/100ML; G/100ML; G/100ML
INJECTION, SOLUTION INTRAVENOUS ONCE
Status: COMPLETED | OUTPATIENT
Start: 2023-09-05 | End: 2023-09-05

## 2023-09-05 RX ORDER — FENTANYL CITRATE 50 UG/ML
INJECTION, SOLUTION INTRAMUSCULAR; INTRAVENOUS
Status: DISCONTINUED
Start: 2023-09-05 | End: 2023-09-06 | Stop reason: HOSPADM

## 2023-09-05 RX ORDER — OXYTOCIN 10 [USP'U]/ML
10 INJECTION, SOLUTION INTRAMUSCULAR; INTRAVENOUS
Status: DISCONTINUED | OUTPATIENT
Start: 2023-09-05 | End: 2023-09-06 | Stop reason: HOSPADM

## 2023-09-05 RX ORDER — OXYTOCIN/0.9 % SODIUM CHLORIDE 30/500 ML
1-24 PLASTIC BAG, INJECTION (ML) INTRAVENOUS CONTINUOUS
Status: DISCONTINUED | OUTPATIENT
Start: 2023-09-05 | End: 2023-09-06 | Stop reason: HOSPADM

## 2023-09-05 RX ORDER — CARBOPROST TROMETHAMINE 250 UG/ML
250 INJECTION, SOLUTION INTRAMUSCULAR
Status: DISCONTINUED | OUTPATIENT
Start: 2023-09-05 | End: 2023-09-06 | Stop reason: HOSPADM

## 2023-09-05 RX ORDER — SODIUM CHLORIDE, SODIUM LACTATE, POTASSIUM CHLORIDE, CALCIUM CHLORIDE 600; 310; 30; 20 MG/100ML; MG/100ML; MG/100ML; MG/100ML
INJECTION, SOLUTION INTRAVENOUS CONTINUOUS
Status: DISCONTINUED | OUTPATIENT
Start: 2023-09-05 | End: 2023-09-05

## 2023-09-05 RX ORDER — FENTANYL CITRATE 50 UG/ML
100 INJECTION, SOLUTION INTRAMUSCULAR; INTRAVENOUS ONCE
Status: COMPLETED | OUTPATIENT
Start: 2023-09-05 | End: 2023-09-05

## 2023-09-05 RX ORDER — CITRIC ACID/SODIUM CITRATE 334-500MG
30 SOLUTION, ORAL ORAL
Status: DISCONTINUED | OUTPATIENT
Start: 2023-09-05 | End: 2023-09-06 | Stop reason: HOSPADM

## 2023-09-05 RX ORDER — FENTANYL CITRATE 50 UG/ML
100 INJECTION, SOLUTION INTRAMUSCULAR; INTRAVENOUS
Status: DISCONTINUED | OUTPATIENT
Start: 2023-09-05 | End: 2023-09-06 | Stop reason: HOSPADM

## 2023-09-05 RX ORDER — ACETAMINOPHEN 325 MG/1
650 TABLET ORAL EVERY 4 HOURS PRN
Status: DISCONTINUED | OUTPATIENT
Start: 2023-09-05 | End: 2023-09-06 | Stop reason: HOSPADM

## 2023-09-05 RX ORDER — METOCLOPRAMIDE HYDROCHLORIDE 5 MG/ML
10 INJECTION INTRAMUSCULAR; INTRAVENOUS EVERY 6 HOURS PRN
Status: DISCONTINUED | OUTPATIENT
Start: 2023-09-05 | End: 2023-09-06 | Stop reason: HOSPADM

## 2023-09-05 RX ORDER — PENICILLIN G POTASSIUM 5000000 [IU]/1
5 INJECTION, POWDER, FOR SOLUTION INTRAMUSCULAR; INTRAVENOUS ONCE
Status: COMPLETED | OUTPATIENT
Start: 2023-09-05 | End: 2023-09-05

## 2023-09-05 RX ORDER — PROCHLORPERAZINE MALEATE 5 MG
10 TABLET ORAL EVERY 6 HOURS PRN
Status: DISCONTINUED | OUTPATIENT
Start: 2023-09-05 | End: 2023-09-06 | Stop reason: HOSPADM

## 2023-09-05 RX ORDER — NALBUPHINE HYDROCHLORIDE 20 MG/ML
2.5-5 INJECTION, SOLUTION INTRAMUSCULAR; INTRAVENOUS; SUBCUTANEOUS EVERY 6 HOURS PRN
Status: DISCONTINUED | OUTPATIENT
Start: 2023-09-05 | End: 2023-09-07 | Stop reason: HOSPADM

## 2023-09-05 RX ORDER — DEXTROSE, SODIUM CHLORIDE, SODIUM LACTATE, POTASSIUM CHLORIDE, AND CALCIUM CHLORIDE 5; .6; .31; .03; .02 G/100ML; G/100ML; G/100ML; G/100ML; G/100ML
INJECTION, SOLUTION INTRAVENOUS CONTINUOUS
Status: DISCONTINUED | OUTPATIENT
Start: 2023-09-05 | End: 2023-09-07 | Stop reason: HOSPADM

## 2023-09-05 RX ORDER — ROPIVACAINE HYDROCHLORIDE 2 MG/ML
10 INJECTION, SOLUTION EPIDURAL; INFILTRATION; PERINEURAL ONCE
Status: DISCONTINUED | OUTPATIENT
Start: 2023-09-05 | End: 2023-09-06 | Stop reason: HOSPADM

## 2023-09-05 RX ORDER — MISOPROSTOL 200 UG/1
400 TABLET ORAL
Status: DISCONTINUED | OUTPATIENT
Start: 2023-09-05 | End: 2023-09-06 | Stop reason: HOSPADM

## 2023-09-05 RX ORDER — TRANEXAMIC ACID 10 MG/ML
1 INJECTION, SOLUTION INTRAVENOUS EVERY 30 MIN PRN
Status: DISCONTINUED | OUTPATIENT
Start: 2023-09-05 | End: 2023-09-06 | Stop reason: HOSPADM

## 2023-09-05 RX ORDER — ROPIVACAINE HYDROCHLORIDE 2 MG/ML
10 INJECTION, SOLUTION EPIDURAL; INFILTRATION; PERINEURAL ONCE
Status: COMPLETED | OUTPATIENT
Start: 2023-09-05 | End: 2023-09-05

## 2023-09-05 RX ORDER — METHYLERGONOVINE MALEATE 0.2 MG/ML
200 INJECTION INTRAVENOUS
Status: DISCONTINUED | OUTPATIENT
Start: 2023-09-05 | End: 2023-09-06 | Stop reason: HOSPADM

## 2023-09-05 RX ORDER — EPHEDRINE SULFATE 50 MG/ML
5 INJECTION, SOLUTION INTRAMUSCULAR; INTRAVENOUS; SUBCUTANEOUS
Status: DISCONTINUED | OUTPATIENT
Start: 2023-09-05 | End: 2023-09-06 | Stop reason: HOSPADM

## 2023-09-05 RX ORDER — MISOPROSTOL 200 UG/1
800 TABLET ORAL
Status: DISCONTINUED | OUTPATIENT
Start: 2023-09-05 | End: 2023-09-06 | Stop reason: HOSPADM

## 2023-09-05 RX ORDER — OXYTOCIN 10 [USP'U]/ML
10 INJECTION, SOLUTION INTRAMUSCULAR; INTRAVENOUS
Status: DISCONTINUED | OUTPATIENT
Start: 2023-09-05 | End: 2023-09-07

## 2023-09-05 RX ORDER — PENICILLIN G 3000000 [IU]/50ML
3 INJECTION, SOLUTION INTRAVENOUS EVERY 4 HOURS
Status: DISCONTINUED | OUTPATIENT
Start: 2023-09-05 | End: 2023-09-06 | Stop reason: HOSPADM

## 2023-09-05 RX ORDER — OXYTOCIN/0.9 % SODIUM CHLORIDE 30/500 ML
340 PLASTIC BAG, INJECTION (ML) INTRAVENOUS CONTINUOUS PRN
Status: DISCONTINUED | OUTPATIENT
Start: 2023-09-05 | End: 2023-09-06 | Stop reason: HOSPADM

## 2023-09-05 RX ORDER — METOCLOPRAMIDE 10 MG/1
10 TABLET ORAL EVERY 6 HOURS PRN
Status: DISCONTINUED | OUTPATIENT
Start: 2023-09-05 | End: 2023-09-06 | Stop reason: HOSPADM

## 2023-09-05 RX ADMIN — ROPIVACAINE HYDROCHLORIDE 10 ML: 2 INJECTION, SOLUTION EPIDURAL; INFILTRATION at 13:48

## 2023-09-05 RX ADMIN — ROPIVACAINE HYDROCHLORIDE 10 ML: 2 INJECTION, SOLUTION EPIDURAL; INFILTRATION at 23:03

## 2023-09-05 RX ADMIN — ACETAMINOPHEN 650 MG: 325 TABLET, FILM COATED ORAL at 18:27

## 2023-09-05 RX ADMIN — SODIUM CHLORIDE, SODIUM LACTATE, POTASSIUM CHLORIDE, CALCIUM CHLORIDE AND DEXTROSE MONOHYDRATE: 5; 600; 310; 30; 20 INJECTION, SOLUTION INTRAVENOUS at 16:18

## 2023-09-05 RX ADMIN — PENICILLIN G POTASSIUM 5 MILLION UNITS: 5000000 POWDER, FOR SOLUTION INTRAMUSCULAR; INTRAPLEURAL; INTRATHECAL; INTRAVENOUS at 13:11

## 2023-09-05 RX ADMIN — PENICILLIN G 3 MILLION UNITS: 3000000 INJECTION, SOLUTION INTRAVENOUS at 17:23

## 2023-09-05 RX ADMIN — FENTANYL CITRATE 100 MCG: 50 INJECTION, SOLUTION INTRAMUSCULAR; INTRAVENOUS at 23:03

## 2023-09-05 RX ADMIN — Medication: at 20:07

## 2023-09-05 RX ADMIN — SODIUM CHLORIDE, POTASSIUM CHLORIDE, SODIUM LACTATE AND CALCIUM CHLORIDE 1000 ML: 600; 310; 30; 20 INJECTION, SOLUTION INTRAVENOUS at 13:11

## 2023-09-05 RX ADMIN — Medication 1 MILLI-UNITS/MIN: at 16:17

## 2023-09-05 RX ADMIN — SODIUM CHLORIDE, SODIUM LACTATE, POTASSIUM CHLORIDE, CALCIUM CHLORIDE AND DEXTROSE MONOHYDRATE: 5; 600; 310; 30; 20 INJECTION, SOLUTION INTRAVENOUS at 22:40

## 2023-09-05 RX ADMIN — SODIUM CHLORIDE, POTASSIUM CHLORIDE, SODIUM LACTATE AND CALCIUM CHLORIDE: 600; 310; 30; 20 INJECTION, SOLUTION INTRAVENOUS at 13:29

## 2023-09-05 RX ADMIN — PENICILLIN G 3 MILLION UNITS: 3000000 INJECTION, SOLUTION INTRAVENOUS at 21:27

## 2023-09-05 RX ADMIN — Medication: at 13:45

## 2023-09-05 ASSESSMENT — ACTIVITIES OF DAILY LIVING (ADL)
ADLS_ACUITY_SCORE: 18
ADLS_ACUITY_SCORE: 18
DOING_ERRANDS_INDEPENDENTLY_DIFFICULTY: NO
ADLS_ACUITY_SCORE: 18
WEAR_GLASSES_OR_BLIND: NO
ADLS_ACUITY_SCORE: 18
CHANGE_IN_FUNCTIONAL_STATUS_SINCE_ONSET_OF_CURRENT_ILLNESS/INJURY: NO
ADLS_ACUITY_SCORE: 18
CONCENTRATING,_REMEMBERING_OR_MAKING_DECISIONS_DIFFICULTY: NO
DIFFICULTY_EATING/SWALLOWING: NO
DRESSING/BATHING_DIFFICULTY: NO
ADLS_ACUITY_SCORE: 18
WALKING_OR_CLIMBING_STAIRS_DIFFICULTY: NO
FALL_HISTORY_WITHIN_LAST_SIX_MONTHS: NO
TOILETING_ISSUES: NO

## 2023-09-05 NOTE — ANESTHESIA PREPROCEDURE EVALUATION
Anesthesia Pre-Procedure Evaluation    Patient: Latasha Hawley   MRN: [id number] : 1995        Procedure :           History reviewed. No pertinent past medical history.   History reviewed. No pertinent surgical history.   Allergies   Allergen Reactions    Adhesive Tape Rash      Social History     Tobacco Use    Smoking status: Never    Smokeless tobacco: Never   Substance Use Topics    Alcohol use: Not Currently     Comment: not during pregnancy      Wt Readings from Last 1 Encounters:   22 77.1 kg (170 lb)        Anesthesia Evaluation            ROS/MED HX  ENT/Pulmonary:    (-) asthma   Neurologic:  - neg neurologic ROS     Cardiovascular:    (-) PIH   METS/Exercise Tolerance:     Hematologic:     (+)  no anticoagulation therapy, no coagulopathy,             Musculoskeletal:       GI/Hepatic:       Renal/Genitourinary:       Endo:       Psychiatric/Substance Use:       Infectious Disease:       Malignancy:       Other:            Physical Exam    Airway        Mallampati: II   TM distance: > 3 FB   Neck ROM: full     Respiratory Devices and Support         Dental  no notable dental history         Cardiovascular   cardiovascular exam normal          Pulmonary   pulmonary exam normal                OUTSIDE LABS:  CBC:   Lab Results   Component Value Date    WBC 14.6 (H) 2023    WBC 10.5 2023    HGB 12.8 2023    HGB 13.4 2023    HCT 37.3 2023    HCT 42.5 2023     2023     2023     BMP: No results found for: NA, POTASSIUM, CHLORIDE, CO2, BUN, CR, GLC  COAGS: No results found for: PTT, INR, FIBR  POC: No results found for: BGM, HCG, HCGS  HEPATIC: No results found for: ALBUMIN, PROTTOTAL, ALT, AST, GGT, ALKPHOS, BILITOTAL, BILIDIRECT, EDDIE  OTHER: No results found for: PH, LACT, A1C, ZOEY, PHOS, MAG, LIPASE, AMYLASE, TSH, T4, T3, CRP, SED    Anesthesia Plan    ASA Status:  2       Anesthesia Type: Epidural.               Consents    Anesthesia Plan(s) and associated risks, benefits, and realistic alternatives discussed. Questions answered and patient/representative(s) expressed understanding.     - Discussed:     - Discussed with:  Patient            Postoperative Care            Comments:    Other Comments: Orders to manage the epidural infusion have been entered, and through coordination with the nurse, we will continute to manage and monitor the patient's labor epidural.  We will continuously be available to adjust as needed thruout the entire L&D process.             Aly Ambrose MD

## 2023-09-05 NOTE — PLAN OF CARE
Discussed POC with pt and SO and Frank FERGUSON recommendation of D5LR bolus and Pitocin augmentation.  Pt and SO consent to plan.

## 2023-09-05 NOTE — CARE PLAN
Data: Patient admitted to room 215 at 1230. Patient is a . Prenatal record reviewed.   OB History    Para Term  AB Living   1 0 0 0 0 0   SAB IAB Ectopic Multiple Live Births   0 0 0 0 0      # Outcome Date GA Lbr Ren/2nd Weight Sex Delivery Anes PTL Lv   1 Current            .  Medical History: History reviewed. No pertinent past medical history..  Gestational age 40w5d. Vital signs per doc flowsheet. Fetal movement present. Patient reports Laboring   as reason for admission. Support person Will  present.  Action: Verbal consent for EFM, external fetal monitors applied. Admission assessment completed. Patient and support persons educated on labor process. Patient instructed to report change in fetal movement, contractions, vaginal leaking of fluid or bleeding, abdominal pain, or any concerns related to the pregnancy to her nurse/physician. Patient oriented to room, call light in reach.   Response: Ninoska Salas CNM informed of arrival. Plan per provider is pt ,may receive epidural. Will round after office hours. Patient verbalized understanding of education and verbalized agreement with plan. Patient coping with labor via Epidural.

## 2023-09-05 NOTE — H&P
"OB Brief Admit H&P    No significant change in general health status based on examination of the patient, review of Nursing Admission Database and prenatal record.    Pt is a 28 year old  @ 40w5d who presented to L&D for direct admit.  Was seen in the MAC earlier this morning, SVE 1/thick, discharged to home.  Seen in clinic today by me.  Very uncomfortable, SVE 4/80/-2  Denies leaking of fluid or vaginal bleeding.  States UCs are regular and painful.  Moderate to palp.  Desires epidural for pain control    Patient's prenatal course has been complicated by:  GBS carrier, will need PCN prophylaxis  BMI > 35 baby ASA at 12 weeks, serial growth scans  Anxiety and depression, no meds or therapy x 2 years, has been stable    Prenatal Labs:    Blood type O pos  Rubella immune  GCT failed 1 hr, passed 3hr  GBS positive    EFW: 8.5lbs    /81   Temp 97.7  F (36.5  C)   Resp (P) 16   Ht 1.575 m (5' 2\")   SpO2 96%   BMI 31.09 kg/m    EFM:  135  [place]: 5-6 min  SVE: 4/80%/-2  Membranes:  intact    Assessment:  28 year old  @ 40w5d admitted for early labor, Cat I, GBS positive    Plan:  1. Admit to labor and delivery   2. Start PCN per protocol  3. Expectant management for now  4. Epidural prn  5. Re evaluate prn    Kathi Salas CNM  2023  5:33 PM    "

## 2023-09-05 NOTE — ANESTHESIA PROCEDURE NOTES
"Epidural catheter Procedure Note    Pre-Procedure   Staff -        Anesthesiologist:  Aly Ambrose MD       Performed By: anesthesiologist       Location: OB       Pre-Anesthestic Checklist: patient identified, IV checked, risks and benefits discussed, informed consent, monitors and equipment checked, pre-op evaluation and at physician/surgeon's request  Timeout:       Correct Patient: Yes        Correct Procedure: Yes        Correct Site: Yes        Correct Position: Yes   Procedure Documentation  Procedure: epidural catheter       Patient Position: sitting       Patient Prep/Sterile Barriers: sterile gloves, mask, patient draped       Skin prep: Betadine       Local skin infiltrated with 3 mL of 1% lidocaine.        Insertion Site: L3-4. (midline approach).       Technique: LORT saline        MARSHA at 5 cm.       Needle Type: [employer] needle       Needle Gauge: 17.        Needle Length (Inches): 3.5        Catheter: 19 G.          Catheter threaded easily.         4 cm epidural space.         Threaded 9 cm at skin.         # of attempts: 1 and  # of redirects:          : 0.    Assessment/Narrative         Paresthesias: No.       Test dose of 3 mL lidocaine 1.5% w/ 1:200,000 epinephrine at.         Test dose negative, 3 minutes after injection, for signs of intravascular, subdural, or intrathecal injection.       Insertion/Infusion Method: LORT saline       Aspiration negative for Heme or CSF via Epidural Catheter.    Medication(s) Administered   0.2% Ropivacaine (Epidural) - EPIDURAL   10 mL - 9/5/2023 1:48:00 PM   Comments:  Pt tolerated well. No complications.   Catheter taped sterile and securely with sterile medical adhesive spray and tegaderm.   Pt placed back in supine with DARREN.   FHTs stable post-procedure.        FOR [facility] ([facility]) ONLY:   Pain Team Contact information: please page the Pain Team Via [facility]. Search \"Pain\". During daytime hours, please page the attending first. At night please page " the resident first.

## 2023-09-05 NOTE — DISCHARGE INSTRUCTIONS
Discharge Instruction for Undelivered Patients      You were seen for: Labor Assessment  We Consulted: ADDISON Bates CNM  You had (Test or Medicine):cervix check, monitoring     Diet:   Drink 8 to 12 glasses of liquids (milk, juice, water) every day.  You may eat meals and snacks.     Activity:  Call your doctor or nurse midwife if your baby is moving less than usual.     Call your provider if you notice:  Swelling in your face or increased swelling in your hands or legs.  Headaches that are not relieved by Tylenol (acetaminophen).  Changes in your vision (blurring: seeing spots or stars.)  Nausea (sick to your stomach) and vomiting (throwing up).   Weight gain of 5 pounds or more per week.  Heartburn that doesn't go away.  Signs of bladder infection: pain when you urinate (use the toilet), need to go more often and more urgently.  The bag of ferreira (rupture of membranes) breaks, or you notice leaking in your underwear.  Bright red blood in your underwear.  Abdominal (lower belly) or stomach pain.  For first baby: Contractions (tightening) less than 5 minutes apart for one hour or more.  Increase or change in vaginal discharge (note the color and amount)      Follow-up:  As scheduled in the clinic    Any Day Now  Your guide to early labor at home  Congratulations; you're in early labor! This is an early stage of labor that helps prepare your body for active labor--the phase when you finally meet your baby.   About two-thirds of your entire labor (about 66%) will be in this early stage of labor. If this is your first time in labor, this phase may last 20 hours or more. It may be shorter for people who have been in labor before.   What should I do?  We understand that you have been waiting a long time to meet your baby and that waiting a bit more seems like forever. We suggest spending your early labor at home. Here's why:  You can be comfortable in your own surroundings.  You can relax, which will help your labor  "progress.  It may make the time seem to go by faster.  Together, we will create a plan for when to come to the hospital or follow-up with your health care provider.  We realize that this can be a time of uncertainty, anxiety, and mixed emotions (on top of not being very restful). We hope the suggestions in this document will make your early labor a bit more comfortable and may even help speed up the process.  What should I know about early labor?  Early labor is the first stage of labor. In this stage, your uterus begins having contractions to prepare for childbirth. When you have a contraction, the muscles of the uterus tighten and relax. Contractions help your cervix to thin and shorten (efface) and open (dilate) so your baby can be born.   Labor contractions increase steadily over time. They become stronger and more frequent until they are happening every 5 minutes or more.  You may have already had some \"practice\" contractions called Pickett Guerrier. While these contractions may be strong and sometimes uncomfortable, they are not true labor contractions. Mario Guerrier contractions don't help your cervix dilate the way that labor contractions do  Words you'll hear  Cervix - the opening to the uterus. The cervix must be fully dilated (open) for your baby to be born. The cervix is in the back of the vagina.  Contractions - when the muscles of the uterus tighten and relax.   Dilated -the openness of your cervix; it is measured in centimeters from 0 to 10.  Effaced - shortening and thinning of the cervix; it is measured from 0 to 100%.   Uterus - where your baby is located.   How does labor typcally progress?  The amount of dilation in your cervix lets us know how close you are to delivery. Labor often progresses like this:  0 centimeters: The cervix is closed.  1 to 5 centimeters: You start having regular contractions to dilate your cervix (early labor). This slowly prepares your body for childbirth.  6 centimeters: " This is when active labor begins. Contractions happen in a regular pattern that increases steadily over time.  10 centimeters: Your cervix is completely dilated. You are ready to start pushing.     What can I do to help my labor progress?  Diet  It is important to stay hydrated, especially with water, broths, ice pops (Popsicles).  Eat light snacks or meals you find comforting.  As your body transitions into active labor, you will likely be less hungry. You may also feel nauseous (sick to your stomach). Eat light meals that you feel you can tolerate.  Keep your energy up with good nutrition, including fruits and vegetables.  Activity  It is important to rest as your body allows. Talk to your health care team if you find it hard to sleep or rest.  You should continue to feel your baby move. You can expect to feel 10 movements each hour.  If your water breaks, avoid sexual intercourse or putting anything into your vagina.  Positions to try  Changing your position often may help your labor to progress and feel more comfortable.  Abdominal tilt     Tilt your hips forward. Use your hands to gently lift your belly (or ask a support person). By lifting your belly and tilting your hips forward, you are creating a straighter line for your baby to come down into your pelvis.   Side-lying resting     Lie on your side and pull your top leg up and over to a 90-degree angle, if possible. This allows a comfortable position for you to rest and your pelvis to open up. Try this position on your left side, and then your right side.   Exercise/birthing ball     A large exercise ball gives you a break from standing, plus it allows movement and lets gravity do its job. Rocking or swaying on the ball allows you to be upright so your baby can come down into your pelvis.   Hands and knees  You can also try positioning yourself on your hands and knees, with or without the ball for support.     How can my support person help me?  Talk to your  support person ahead of time about ways to help you during labor. For example, your support person can:  Review different positions and comfort measures with you ahead of time. This will help you feel more prepared.  Make you healthy snacks ahead of time so you can keep your energy up.  Encourage you through breathing and relaxation techniques. This will help you stay focused.  Finding comfort during labor  Some ideas to help refocus, calm anxiety, and relax tense muscles:  Listen to soft music, watch a movie, or visit social medial (such as [employer]).  Use meditation or guided imagery to create pictures or stories in your mind.  Walk around.  Take a warm bath or shower.  Try rhythmic movement, like slow dancing or using a rocking chair.  Try massaging touch to your comfort level on the hips, lower back or feet. You can also try different pressure levels (firm pressure is safe) or vibration.  Deep breathing (may be called the 4-7-8 technique, square breathing, or relaxing breath).  Aromatherapy (peppermint, lavender and citrus are great choices for nausea and relaxation).  Wear a belly support band.  Wrap a hot/cold pack in a towel to protect your skin. Apply cold packs to your lower back or pulse points. Or, use heat on your lower back. Leave the hot/cold pack on for 30 minutes or less.  For helpful videos on comfort during labor, please visit https://evidencebasedbNewzstandth.com/category-pain-management-series.  How will I know when I'm in active labor?  Start timing your contractions when they become stronger or more intense. Time your contractions from the start of one contraction until the start of another.  Signs of active labor  If this is your first baby:  Your contractions are 5 minutes apart; and  Last more than 1 minute; and  Have been consistently getting stronger   for 1 hour or more.  If this is your second baby or beyond:  Your contractions are less than 10 minutes apart; and  Have been consistently getting  stronger   for 1 hour or more.  When to call your provider:  Call your provider right away if you have any of these issues:  You have any signs of active labor previously listed.  Bright red bleeding in your underwear.  You think your water has broken.  Your temperature 100.4 F (38 C) or higher.  If you are less than 34 weeks:  More than 6 contractions in one hour  Follow-up visits  Please keep your regularly scheduled clinic appointment with your pregnancy provider.  For informational purposes only. Not to replace the advice of your health care provider. Cervical effacement and dilation image ID [id number]   [employer]. All rights reserved. Text and other images copyright   2022 [employer]. All rights reserved. Clinically reviewed by the Safe Vaginal Birth Steering Team. [facility] [id number] - 03/23.

## 2023-09-05 NOTE — PROVIDER NOTIFICATION
09/05/23 1545   Provider Notification   Provider Name/Title Frank FERGUSON   Method of Notification Electronic Page   Request Evaluate - Remote   Notification Reason SVE;Status Update       Frank FERGUSON called back.  Updated on pt status, FHT's, contraction pattern, and SVE.  Orders given to bolus D5LR over an hour and Augment with Oxytocin.  Will be in to see pt in an hour.

## 2023-09-05 NOTE — PROGRESS NOTES
Pt arrives ambulatory to 235 for labor assessment.  Pt states she has been losing mucous over the last day.  She is having mild contractions that are about 4 minutes apart and feel like first-day period cramps.  She has had some bloody discharge.  She reports + fetal movement.    Consent obtained for external fetal and uterine monitoring - monitors applied.  Assessment and admission completed.      Will present and supportive.

## 2023-09-05 NOTE — CARE PLAN
Patient requesting epidural. IV LR bolus started. MDA at bedside and consent performed. Patient positioned. Test dose negative. Patient positioned in bed with left tilt. Continuous BP. Patient comfortable and sleeping with epidural.

## 2023-09-05 NOTE — PROGRESS NOTES
"OB Progress Note  2023  5:44 PM    S:  Very comfortable with epidural.  Agrees to AROM      O:  /81   Temp 97.7  F (36.5  C)   Resp (P) 16   Ht 1.575 m (5' 2\")   SpO2 96%   BMI 31.09 kg/m    EFM: baseline 135, accelerations absent, absent decelerations, min to mod variability; Category I  [place]:  Ctx q5-7 min  SVE:  5/90%/-1 to -2  Membranes: SROM with exam, light meconium  Pitocin @ 4mu/min    A/P:  28 year old  @40w5d, labor, SROM mec stained fluid, Cat I    1.  Routine care  2.  Continue PCN and Pitocin per protocol  3.  Meconium stained fluid, plan NICU team at delivery  4.  Re eval as needed    Kathi Salas CNM  "

## 2023-09-06 LAB — HGB BLD-MCNC: 11.8 G/DL (ref 11.7–15.7)

## 2023-09-06 PROCEDURE — 250N000013 HC RX MED GY IP 250 OP 250 PS 637: Performed by: MIDWIFE

## 2023-09-06 PROCEDURE — 722N000001 HC LABOR CARE VAGINAL DELIVERY SINGLE

## 2023-09-06 PROCEDURE — 36415 COLL VENOUS BLD VENIPUNCTURE: CPT | Performed by: MIDWIFE

## 2023-09-06 PROCEDURE — 0HQ9XZZ REPAIR PERINEUM SKIN, EXTERNAL APPROACH: ICD-10-PCS | Performed by: MIDWIFE

## 2023-09-06 PROCEDURE — 120N000012 HC R&B POSTPARTUM

## 2023-09-06 PROCEDURE — 250N000011 HC RX IP 250 OP 636: Performed by: SURGERY

## 2023-09-06 PROCEDURE — 250N000011 HC RX IP 250 OP 636: Mod: JZ | Performed by: MIDWIFE

## 2023-09-06 PROCEDURE — 85018 HEMOGLOBIN: CPT | Performed by: MIDWIFE

## 2023-09-06 RX ORDER — OXYTOCIN 10 [USP'U]/ML
10 INJECTION, SOLUTION INTRAMUSCULAR; INTRAVENOUS
Status: DISCONTINUED | OUTPATIENT
Start: 2023-09-06 | End: 2023-09-07 | Stop reason: HOSPADM

## 2023-09-06 RX ORDER — HYDROCORTISONE 25 MG/G
CREAM TOPICAL 3 TIMES DAILY PRN
Status: DISCONTINUED | OUTPATIENT
Start: 2023-09-06 | End: 2023-09-07 | Stop reason: HOSPADM

## 2023-09-06 RX ORDER — MISOPROSTOL 200 UG/1
800 TABLET ORAL
Status: DISCONTINUED | OUTPATIENT
Start: 2023-09-06 | End: 2023-09-07 | Stop reason: HOSPADM

## 2023-09-06 RX ORDER — MISOPROSTOL 200 UG/1
400 TABLET ORAL
Status: DISCONTINUED | OUTPATIENT
Start: 2023-09-06 | End: 2023-09-07 | Stop reason: HOSPADM

## 2023-09-06 RX ORDER — ACETAMINOPHEN 325 MG/1
650 TABLET ORAL EVERY 4 HOURS PRN
Status: DISCONTINUED | OUTPATIENT
Start: 2023-09-06 | End: 2023-09-07 | Stop reason: HOSPADM

## 2023-09-06 RX ORDER — DOCUSATE SODIUM 100 MG/1
100 CAPSULE, LIQUID FILLED ORAL DAILY
Status: DISCONTINUED | OUTPATIENT
Start: 2023-09-06 | End: 2023-09-07 | Stop reason: HOSPADM

## 2023-09-06 RX ORDER — IBUPROFEN 400 MG/1
800 TABLET, FILM COATED ORAL EVERY 6 HOURS PRN
Status: DISCONTINUED | OUTPATIENT
Start: 2023-09-06 | End: 2023-09-07 | Stop reason: HOSPADM

## 2023-09-06 RX ORDER — TRANEXAMIC ACID 10 MG/ML
1 INJECTION, SOLUTION INTRAVENOUS EVERY 30 MIN PRN
Status: DISCONTINUED | OUTPATIENT
Start: 2023-09-06 | End: 2023-09-07 | Stop reason: HOSPADM

## 2023-09-06 RX ORDER — CARBOPROST TROMETHAMINE 250 UG/ML
250 INJECTION, SOLUTION INTRAMUSCULAR
Status: DISCONTINUED | OUTPATIENT
Start: 2023-09-06 | End: 2023-09-07 | Stop reason: HOSPADM

## 2023-09-06 RX ORDER — OXYTOCIN/0.9 % SODIUM CHLORIDE 30/500 ML
340 PLASTIC BAG, INJECTION (ML) INTRAVENOUS CONTINUOUS PRN
Status: DISCONTINUED | OUTPATIENT
Start: 2023-09-06 | End: 2023-09-07 | Stop reason: HOSPADM

## 2023-09-06 RX ORDER — MODIFIED LANOLIN
OINTMENT (GRAM) TOPICAL
Status: DISCONTINUED | OUTPATIENT
Start: 2023-09-06 | End: 2023-09-07 | Stop reason: HOSPADM

## 2023-09-06 RX ORDER — METHYLERGONOVINE MALEATE 0.2 MG/ML
200 INJECTION INTRAVENOUS
Status: DISCONTINUED | OUTPATIENT
Start: 2023-09-06 | End: 2023-09-07 | Stop reason: HOSPADM

## 2023-09-06 RX ORDER — BISACODYL 10 MG
10 SUPPOSITORY, RECTAL RECTAL DAILY PRN
Status: DISCONTINUED | OUTPATIENT
Start: 2023-09-06 | End: 2023-09-07 | Stop reason: HOSPADM

## 2023-09-06 RX ADMIN — IBUPROFEN 800 MG: 400 TABLET ORAL at 20:14

## 2023-09-06 RX ADMIN — Medication: at 02:04

## 2023-09-06 RX ADMIN — IBUPROFEN 800 MG: 400 TABLET ORAL at 05:49

## 2023-09-06 RX ADMIN — DOCUSATE SODIUM 100 MG: 100 CAPSULE, LIQUID FILLED ORAL at 11:56

## 2023-09-06 RX ADMIN — ACETAMINOPHEN 650 MG: 325 TABLET, FILM COATED ORAL at 11:56

## 2023-09-06 RX ADMIN — IBUPROFEN 800 MG: 400 TABLET ORAL at 11:55

## 2023-09-06 RX ADMIN — PENICILLIN G 3 MILLION UNITS: 3000000 INJECTION, SOLUTION INTRAVENOUS at 02:24

## 2023-09-06 RX ADMIN — METHYLERGONOVINE MALEATE 200 MCG: 0.2 INJECTION INTRAVENOUS at 03:59

## 2023-09-06 RX ADMIN — ACETAMINOPHEN 650 MG: 325 TABLET, FILM COATED ORAL at 05:49

## 2023-09-06 RX ADMIN — ACETAMINOPHEN 650 MG: 325 TABLET, FILM COATED ORAL at 20:13

## 2023-09-06 ASSESSMENT — ACTIVITIES OF DAILY LIVING (ADL)
ADLS_ACUITY_SCORE: 18

## 2023-09-06 NOTE — L&D DELIVERY NOTE
OB Delivery Summary    1.  with  IUP @ 40w6d  2.  Pregnancy complications- GBS carrier, adequately treated with PCN x 3 doses.  Hx of anxiety and depression, stable at present.  BMI >35  3.  Labor- Spontaneous and augmented with Pitocin  4   Analgesia- Labor Epidural-  5.  Fetal heart tones-140 baseline, accelerations absent, mod variability, variable decelerations with pushing, return to baseline after contraction  6.  Labor interventions- IV antibiotics for GBS  7.  Membranes-SROM @ 1744 on 2023  8.   Infant- viable, vigorous female, vertex, MOLLY delivered at 0350 on 2023, Apgars 7 and 9 at one and five minutes.  Weight 8 pounds 15lbs  9.   Placenta- delivered spontaneously at 0357, in tact with 3V cord   10.  Lacerations- first degree laceration, epidural used for anesthesia, 3-0 vicryl suture used to repair in the usual fashion  11.  Complications- Loose nucal cord present, easily reduced.  Anterior shoulder delivered without difficulty, posterior shoulder not released with gentle traction.  Pt placed in Norm, suprapubic pressure applied, able to release posterior shoulder and remainder of baby delievered easily.  80 seconds from vertex to birth of baby.  NICU team present for meconium stained fluid.  Cord clamped, cut and baby taken to warmer.  Spontaneous cry, stable, returned to mom by 10 minutes of age  12.  EBL- 547cc  13.  Labor course:  1st stage 13 hours                                  2nd stage 1hr 50min                                   3rd stage 7min    Kathi Salas CNM  2023  4:34 AM

## 2023-09-06 NOTE — PLAN OF CARE
2250-MDA paged  2252-MDA called back update on pt discomfort will come to celi.  2257-MDA at bedside Handoff of Fentanyl and Ropivacaine.    2300-Pt repositioned to L tilt.  Report given to Becky JOSEPH RN  2310-Pt feeling some relief.

## 2023-09-06 NOTE — PROGRESS NOTES
Data: Latasha Hawley transferred to [facility] via wheelchair at 0625. Baby transferred via parent's arms.  Action: Receiving unit notified of transfer: Yes. Patient and family notified of room change. Report given to Bella YING at 0625. Belongings sent to receiving unit. Accompanied by Registered Nurse. Oriented patient to surroundings. Call light within reach. ID bands double-checked with receiving RN.  Response: Patient tolerated transfer and is stable.

## 2023-09-06 NOTE — PROVIDER NOTIFICATION
09/05/23 2245   Provider Notification   Provider Name/Title Frank FERGUSON   Method of Notification At Bedside   Request Evaluate in Person         Pt feeling rectal discomfort after using pump bumps.  Will call MDA to celi.

## 2023-09-06 NOTE — PROGRESS NOTES
"OB Post-partum Note  PPD# 0    S:  Patient doing well, just moved up to postpartum and settling in.  Pain controlled.  Voiding without difficulty.  Bleeding normal, no large clots.  Breastfeeding with support of nursing staff.    O:  BP (P) 111/81   Pulse (P) 76   Temp (P) 98  F (36.7  C) (Oral)   Resp (P) 18   Ht 1.575 m (5' 2\")   SpO2 90%   Breastfeeding Unknown   BMI 31.09 kg/m    Gen- A&O, NAD  Abd- Non-tender, fundus firm at umbilicus  Ext- non-tender, trace edema    Hemoglobin   Date Value Ref Range Status   2023 11.8 11.7 - 15.7 g/dL Final     O POS  Rubella Immune    A/P: 28 year old  PPD# 0 s/p     1.  Routine post-partum cares.  2.  Continue tylenol/ibuprofen for pain management  3.  Anxiety/depression - no meds or therapy x2 years, has been stable. Will plan to offer 2 week mood check  4.  Lactation support as needed  5.  Anticipate d/c home on PPD# 2.    ADAN Michaels CNM  2023  8:05 AM  "

## 2023-09-06 NOTE — PLAN OF CARE
VSS, fundus firm, light flow. Using IP and Tucks. Voiding without difficulty. Saline lock removed.  Taking Tylenol and Ibuprofen for pain. Breastfeeding her baby girl with assist to position and get a deep latch.   Goal Outcome Evaluation:      Plan of Care Reviewed With: patient    Overall Patient Progress: improvingOverall Patient Progress: improving

## 2023-09-06 NOTE — PROGRESS NOTES
"OB Progress Note  2023  1:10 AM    S:  Feeling much better, was able to rest with rebolus      O:  BP 98/53   Temp 98.8  F (37.1  C) (Temporal)   Resp 16   Ht 1.575 m (5' 2\")   SpO2 93%   BMI 31.09 kg/m    EFM: baseline 140, accelerations absent, absent decelerations, mod variability; Category I  IUPC:  UCs q 2-5, lasting 50-70sec    SVE:  9.5/100/0  lip of cervix present on right  Membranes: light meconium  Pitocin: 6mu/min    A/P:  28 year old  @40w6d, labor, Cat I, ruptured x 7 hours, afebrile  - re evaluate cervix in 1 hour  - anticipate NSVB  Kathi Salas CNM  "

## 2023-09-06 NOTE — PLAN OF CARE
Goal Outcome Evaluation:      Plan of Care Reviewed With: patient, spouse    Overall Patient Progress: improvingOverall Patient Progress: improving  Pt. transferred from L&D with baby in arms & oriented to unit routines & call light. Bands double checked with Becky, TAE & parents. Educated pt. & spouse on infant safety procedures. Call light in reach. Vital signs stable. Postpartum assessment WDL. Pain controlled with Tylenol & Ibuprofen. Patient voided once in L&D & reminded to call for help before getting OOB. Breastfeeding with assistance.  Patient and infant bonding well. Will continue with current plan of care.

## 2023-09-06 NOTE — PROGRESS NOTES
"OB Progress Note  2023  10:45 PM    S:  Very uncomfortable with pressure. Plan to have anesthesia evaluate      O:  /77   Temp 97.9  F (36.6  C) (Temporal)   Resp 16   Ht 1.575 m (5' 2\")   SpO2 95%   BMI 31.09 kg/m    EFM: baseline 130, accelerations absent, absent decelerations, mod variability; Category I  IUPC  Ctx are about 1 min apart.  Pitocin is at 10mu.  Pitocin off  SVE: deferred    A/P:  28 year old  @40w5d, active labor, Cat I, tachysystole  - Pitocin off, plan 1 hour rest.  Restart at 2330  - Recheck cervix at 0030  - Anesthesia to come rebolus epidural  - Dr Long updated  Kathi Salas CNM  "

## 2023-09-06 NOTE — PROGRESS NOTES
"OB Progress Note  2023  8:35 PM    S:  Remains comfortable with epidural, starting to feel some rectal pressure      O:  BP 95/51   Temp 98.6  F (37  C) (Temporal)   Resp 18   Ht 1.575 m (5' 2\")   SpO2 96%   BMI 31.09 kg/m    EFM: baseline 130, accelerations absent, occasional variable decelerations, mod variability; Category II  [place]:  Ctx q3-4 min  SVE:  7-8/90%/-1  Membranes: ruptured x 3 hours, light mec  Pit @ 8mu    A/P:  28 year old  @40w5d, active labor, Cat II with mod variability, ruptured x 3 hours, afebrile  1.  Routine care  2.  Cat II with occasional variables, mod variability, responds to scalp stim  3.  Continue with Pit/PCN per protocol  4.  Monitor closely for progress  Kathi Salas CNM,   "

## 2023-09-06 NOTE — PROGRESS NOTES
"OB Progress Note  2023  10:18 PM    S:  Continues to feel rectal pressure but overall comfortable with epidural.  Agrees to IUPC placement      O:  /72   Temp 97.9  F (36.6  C) (Temporal)   Resp 16   Ht 1.575 m (5' 2\")   SpO2 96%   BMI 31.09 kg/m    EFM: baseline 140, accelerations absent, absent decelerations, mod variability; Category I  [place]:  Ctx q3-4 min  SVE:  8/90%/-1  Membranes: ruptured, mec stained fluid  Pitocin 8mu/min    A/P:  28 year old  @40w5d, active labor, Pitocin, ruptured x 4 hours, afebrile  1.  Routine care  2.  IUPC placed as there has been little cervical change in 1.5 hours  3.  Continue pitocin/pcn per protocol  4.  Continue to monitor closely for progress    Kathi Salas CNM  "

## 2023-09-06 NOTE — LACTATION NOTE
Initial Lactation visit with Latasha, FOABEBE, and baby girl. Latasha reports feeding is going ok so far. Baby has had a couple feedings, but now has been sleepy. Discussed  recovery period. Latasha has a nipple shield in room, has not used but was given due to smoother nipples. Discussed breast feeding attempts with no shield, but if unable to latch using shield. Education provided on nipple shield use. Hand expression taught. Hand expressed on to spoon and drops given to baby. Recommend unlimited, frequent breast feedings: At least 8 - 12 times every 24 hours. Recommended rooming in. Instructed in hand expression. Avoid pacifiers and supplementation with formula unless medically indicated. Explained benefits of holding baby skin on skin to help promote better breastfeeding outcomes. Latasha has a pump for home use. Will revisit as needed.    Adelina Fisher RN, IBCLC

## 2023-09-06 NOTE — PROVIDER NOTIFICATION
09/05/23 2235   Provider Notification   Provider Name/Title Frank FERGUSON   Method of Notification At Bedside   Request Evaluate in Person   Notification Reason Status Update  (strip reviewed)       Frank FERGUSON at pt bedside.  Plan to turn Pitocin off for an hour and then restart.  Change LR to D5LR at 125 cc/hr.

## 2023-09-07 ENCOUNTER — ANESTHESIA (OUTPATIENT)
Dept: OBGYN | Facility: CLINIC | Age: 28
End: 2023-09-07
Payer: COMMERCIAL

## 2023-09-07 ENCOUNTER — ANESTHESIA EVENT (OUTPATIENT)
Dept: OBGYN | Facility: CLINIC | Age: 28
End: 2023-09-07
Payer: COMMERCIAL

## 2023-09-07 VITALS
HEART RATE: 70 BPM | BODY MASS INDEX: 36.95 KG/M2 | HEIGHT: 62 IN | OXYGEN SATURATION: 90 % | DIASTOLIC BLOOD PRESSURE: 79 MMHG | SYSTOLIC BLOOD PRESSURE: 120 MMHG | RESPIRATION RATE: 18 BRPM | WEIGHT: 200.8 LBS | TEMPERATURE: 97.6 F

## 2023-09-07 PROCEDURE — 250N000013 HC RX MED GY IP 250 OP 250 PS 637: Performed by: MIDWIFE

## 2023-09-07 RX ORDER — ACETAMINOPHEN 325 MG/1
650-975 TABLET ORAL EVERY 8 HOURS PRN
Refills: 0 | COMMUNITY
Start: 2023-09-07

## 2023-09-07 RX ORDER — IBUPROFEN 800 MG/1
800 TABLET, FILM COATED ORAL EVERY 6 HOURS PRN
COMMUNITY
Start: 2023-09-07

## 2023-09-07 RX ORDER — DOCUSATE SODIUM 100 MG/1
100 CAPSULE, LIQUID FILLED ORAL DAILY
Qty: 60 CAPSULE | Refills: 0 | Status: SHIPPED | OUTPATIENT
Start: 2023-09-08

## 2023-09-07 RX ADMIN — DOCUSATE SODIUM 100 MG: 100 CAPSULE, LIQUID FILLED ORAL at 09:04

## 2023-09-07 RX ADMIN — IBUPROFEN 800 MG: 400 TABLET ORAL at 01:44

## 2023-09-07 RX ADMIN — ACETAMINOPHEN 650 MG: 325 TABLET, FILM COATED ORAL at 01:44

## 2023-09-07 RX ADMIN — IBUPROFEN 800 MG: 400 TABLET ORAL at 09:03

## 2023-09-07 RX ADMIN — ACETAMINOPHEN 650 MG: 325 TABLET, FILM COATED ORAL at 09:04

## 2023-09-07 ASSESSMENT — ACTIVITIES OF DAILY LIVING (ADL)
ADLS_ACUITY_SCORE: 18

## 2023-09-07 NOTE — PROGRESS NOTES
"OB Post-partum Note  PPD# 1    S:  Patient doing well.  Pain controlled.  Voiding.  Bleeding light.  Breast feeding with nipple shield, minimal assist.    O:  /79   Pulse 70   Temp 97.6  F (36.4  C) (Oral)   Resp 18   Ht 1.575 m (5' 2\")   Wt 91.1 kg (200 lb 12.8 oz)   SpO2 90%   Breastfeeding Unknown   BMI 36.73 kg/m    Gen- A&O, NAD  Abd- Non-tender, fundus firm at umbilicus  Ext- non-tender, trace edema    Hemoglobin   Date Value Ref Range Status   2023 11.8 11.7 - 15.7 g/dL Final     O POS  Rubella Immune    A/P: 28 year old  PPD# 1 s/p     1.  Routine post-partum cares.  2.  Anticipate d/c home this afternoon.   3.  Follow up with lactation within 1 week.   4.  Follow up in clinic in 6 weeks, sooner PRN.   5.  Colace Rx sent.    ADAN Paredes  2023  12:59 PM  "

## 2023-09-07 NOTE — LACTATION NOTE
Follow up Lactation visit with Latasha, significant other Chaparro & baby girl Radha. Hoping to discharge home today. Latasha reports feeding is starting to improve today. Radha was sleepier in first 24 hours but has now latched well a few times with nipple shield. At time of visit, Latasha would like to try to feed baby. LC checked baby's suck with gloved finger, noted she had a nutritive suck pattern, but tends to keep tongue more posterior and has a recessed chin. Recommend feeding with the nipple shield.    Placed Radha at left breast in football hold, placed shield, and reviewed hand placement and positioning for feeding. Radha able to latch with LC assist and start a nutritive suck pattern. Reviewed rolling chin down and out for a deeper latch and encouraged Latasha to hold her breast. Latasha felt latch was comfortable. Reviewed looking for droplets of colostrum in shield and what a good feeding should look and feel like.     Discussed listening for audible swallowing as milk volume increases and looking for milk inside of the shield after feedings to determine if baby is transferring milk well when using nipple shield. Discussed strategies for eventual weaning from shield use and recommended outpatient Lactation follow up to assist with weaning from shield.     Discussed cluster feeding, what it is and when to expect it, The Second Night, satiety cues, feeding cues, and reviewed Feeding Log for home use. Encouraged to review Breastfeeding section in Your Guide to Postpartum & [place] [facility].    Reviewed milk supply and engorgement. Reviewed typical timeline of milk supply initiation and progression over first 3-5 days postpartum.  Discussed introducing a bottle and recommendation to wait for bottle introduction for 3-4 weeks unless baby needs to supplement for medical reasons.    Feeding plan: Recommend unlimited, frequent breast feedings: At least 8 - 12 times every 24 hours. If Radha is too sleepy to feed well or only attempts at  breast, recommend pumping and giving back any colostrum via spoon. Encouraged use of feeding log and to record feedings, and void/stool patterns. Latasha has a breast pump for home use. Reviewed outpatient lactation resources & recommend Lactation support. Latasha & Chaparro appreciative of visit.    Maryjo Cerda, RN-C, IBCLC, MNN, PHN, BSN

## 2023-09-07 NOTE — ANESTHESIA POSTPROCEDURE EVALUATION
Patient: Latasha Hawley    Procedure:        Anesthesia Type:  No value filed.    Note:     Postop Pain Control: Uneventful            Sign Out: Well controlled pain   PONV: No   Neuro/Psych: Uneventful            Sign Out: Acceptable/Baseline neuro status   Airway/Respiratory: Uneventful            Sign Out: Acceptable/Baseline resp. status   CV/Hemodynamics: Uneventful            Sign Out: Acceptable CV status; No obvious hypovolemia; No obvious fluid overload   Other NRE: NONE   DID A NON-ROUTINE EVENT OCCUR?            Last vitals:  Vitals:    09/06/23 2015 09/07/23 0024 09/07/23 0407   BP: 116/77 101/68 112/78   Pulse: 82 95 62   Resp: 18 18 18   Temp: 36.4  C (97.5  F) 36.8  C (98.3  F) 36.4  C (97.6  F)   SpO2:          Electronically Signed By: Mary Grace Oates  September 7, 2023  5:13 AM

## 2023-09-07 NOTE — PLAN OF CARE
D: VSS, assessments WDL. Taking Tylenol and Ibuprofen for pain. Using IP and Tucks. Breastfeeding her baby girl. Baby coming off breast repeatedly, shield introduced today and baby able to latch and fed well.   I: Pt. received complete discharge paperwork and home medications as filled by discharge pharmacy.  Pt. was given times of last dose for all discharge medications in writing on discharge medication sheets.  Discharge teaching included home medication, pain management, activity restrictions, postpartum cares, and signs and symptoms of infection.    A: Discharge outcomes on care plan met.  Mother states understanding and comfort with self care and follow up care.   P: Pt. Discharged.  Pt. was accompanied by  and left with personal belongings.Pt. to follow up with OB provider per discharge instructions.  Pt. had no further questions at the time of discharge and no unmet needs were identified.  Goal Outcome Evaluation:      Plan of Care Reviewed With: patient    Overall Patient Progress: improvingOverall Patient Progress: improving

## 2023-09-07 NOTE — DISCHARGE INSTRUCTIONS

## 2023-09-07 NOTE — PLAN OF CARE
Vital signs stable. Postpartum assessment WDL. Pain controlled with tylenol and ibuprofen. Patient voiding without difficulty. Breastfeeding on cue with minimal assist. Fundus firm at the U, with scant amount of bleeding. Patient and infant bonding well. Will continue with current plan of care.

## 2023-10-14 ENCOUNTER — HEALTH MAINTENANCE LETTER (OUTPATIENT)
Age: 28
End: 2023-10-14

## 2024-12-01 ENCOUNTER — HEALTH MAINTENANCE LETTER (OUTPATIENT)
Age: 29
End: 2024-12-01

## 2025-04-16 ENCOUNTER — LAB REQUISITION (OUTPATIENT)
Dept: LAB | Facility: CLINIC | Age: 30
End: 2025-04-16
Payer: COMMERCIAL

## 2025-04-16 DIAGNOSIS — Z12.4 ENCOUNTER FOR SCREENING FOR MALIGNANT NEOPLASM OF CERVIX: ICD-10-CM

## 2025-04-16 PROCEDURE — G0145 SCR C/V CYTO,THINLAYER,RESCR: HCPCS | Mod: ORL | Performed by: ADVANCED PRACTICE MIDWIFE

## 2025-04-21 LAB
BKR LAB AP GYN ADEQUACY: NORMAL
BKR LAB AP GYN INTERPRETATION: NORMAL
BKR LAB AP HPV REFLEX: NORMAL
BKR LAB AP LMP: NORMAL
BKR LAB AP PREVIOUS ABNL DX: NORMAL
BKR LAB AP PREVIOUS ABNORMAL: NORMAL
PATH REPORT.COMMENTS IMP SPEC: NORMAL
PATH REPORT.COMMENTS IMP SPEC: NORMAL
PATH REPORT.RELEVANT HX SPEC: NORMAL